# Patient Record
Sex: FEMALE | Race: BLACK OR AFRICAN AMERICAN
[De-identification: names, ages, dates, MRNs, and addresses within clinical notes are randomized per-mention and may not be internally consistent; named-entity substitution may affect disease eponyms.]

---

## 2018-03-15 ENCOUNTER — HOSPITAL ENCOUNTER (EMERGENCY)
Dept: HOSPITAL 62 - ER | Age: 78
Discharge: HOME | End: 2018-03-15
Payer: MEDICARE

## 2018-03-15 VITALS — SYSTOLIC BLOOD PRESSURE: 179 MMHG | DIASTOLIC BLOOD PRESSURE: 82 MMHG

## 2018-03-15 DIAGNOSIS — I12.0: ICD-10-CM

## 2018-03-15 DIAGNOSIS — K62.5: Primary | ICD-10-CM

## 2018-03-15 DIAGNOSIS — N18.6: ICD-10-CM

## 2018-03-15 DIAGNOSIS — Z87.19: ICD-10-CM

## 2018-03-15 DIAGNOSIS — R01.1: ICD-10-CM

## 2018-03-15 DIAGNOSIS — D64.9: ICD-10-CM

## 2018-03-15 DIAGNOSIS — Z79.899: ICD-10-CM

## 2018-03-15 DIAGNOSIS — Z99.2: ICD-10-CM

## 2018-03-15 DIAGNOSIS — E11.22: ICD-10-CM

## 2018-03-15 DIAGNOSIS — Z87.11: ICD-10-CM

## 2018-03-15 DIAGNOSIS — K64.9: ICD-10-CM

## 2018-03-15 LAB
ADD MANUAL DIFF: NO
ALBUMIN SERPL-MCNC: 3.6 G/DL (ref 3.5–5)
ALP SERPL-CCNC: 118 U/L (ref 38–126)
ALT SERPL-CCNC: 28 U/L (ref 9–52)
ANION GAP SERPL CALC-SCNC: 10 MMOL/L (ref 5–19)
AST SERPL-CCNC: 14 U/L (ref 14–36)
BASOPHILS # BLD AUTO: 0 10^3/UL (ref 0–0.2)
BASOPHILS NFR BLD AUTO: 0.6 % (ref 0–2)
BILIRUB DIRECT SERPL-MCNC: 0.4 MG/DL (ref 0–0.4)
BILIRUB SERPL-MCNC: 0.5 MG/DL (ref 0.2–1.3)
BUN SERPL-MCNC: 31 MG/DL (ref 7–20)
CALCIUM: 9 MG/DL (ref 8.4–10.2)
CHLORIDE SERPL-SCNC: 97 MMOL/L (ref 98–107)
CO2 SERPL-SCNC: 30 MMOL/L (ref 22–30)
EOSINOPHIL # BLD AUTO: 0.1 10^3/UL (ref 0–0.6)
EOSINOPHIL NFR BLD AUTO: 2.8 % (ref 0–6)
ERYTHROCYTE [DISTWIDTH] IN BLOOD BY AUTOMATED COUNT: 18.8 % (ref 11.5–14)
GLUCOSE SERPL-MCNC: 96 MG/DL (ref 75–110)
HCT VFR BLD CALC: 33.2 % (ref 36–47)
HGB BLD-MCNC: 10.5 G/DL (ref 12–15.5)
INR PPP: 1.07
LYMPHOCYTES # BLD AUTO: 1.2 10^3/UL (ref 0.5–4.7)
LYMPHOCYTES NFR BLD AUTO: 24.2 % (ref 13–45)
MCH RBC QN AUTO: 29.5 PG (ref 27–33.4)
MCHC RBC AUTO-ENTMCNC: 31.6 G/DL (ref 32–36)
MCV RBC AUTO: 93 FL (ref 80–97)
MONOCYTES # BLD AUTO: 0.4 10^3/UL (ref 0.1–1.4)
MONOCYTES NFR BLD AUTO: 7.8 % (ref 3–13)
NEUTROPHILS # BLD AUTO: 3.3 10^3/UL (ref 1.7–8.2)
NEUTS SEG NFR BLD AUTO: 64.6 % (ref 42–78)
PLATELET # BLD: 226 10^3/UL (ref 150–450)
POTASSIUM SERPL-SCNC: 4.3 MMOL/L (ref 3.6–5)
PROT SERPL-MCNC: 6.5 G/DL (ref 6.3–8.2)
PROTHROMBIN TIME: 14.6 SEC (ref 11.4–15.4)
RBC # BLD AUTO: 3.56 10^6/UL (ref 3.72–5.28)
SODIUM SERPL-SCNC: 136.9 MMOL/L (ref 137–145)
TOTAL CELLS COUNTED % (AUTO): 100 %
WBC # BLD AUTO: 5.1 10^3/UL (ref 4–10.5)

## 2018-03-15 PROCEDURE — 85610 PROTHROMBIN TIME: CPT

## 2018-03-15 PROCEDURE — 82272 OCCULT BLD FECES 1-3 TESTS: CPT

## 2018-03-15 PROCEDURE — 86901 BLOOD TYPING SEROLOGIC RH(D): CPT

## 2018-03-15 PROCEDURE — 99285 EMERGENCY DEPT VISIT HI MDM: CPT

## 2018-03-15 PROCEDURE — 85025 COMPLETE CBC W/AUTO DIFF WBC: CPT

## 2018-03-15 PROCEDURE — 80053 COMPREHEN METABOLIC PANEL: CPT

## 2018-03-15 PROCEDURE — 36415 COLL VENOUS BLD VENIPUNCTURE: CPT

## 2018-03-15 PROCEDURE — 86850 RBC ANTIBODY SCREEN: CPT

## 2018-03-15 PROCEDURE — 86900 BLOOD TYPING SEROLOGIC ABO: CPT

## 2018-03-15 NOTE — ER DOCUMENT REPORT
ED General





- General


Mode of Arrival: Ambulatory


Information source: Patient


TRAVEL OUTSIDE OF THE [place] IN LAST 30 DAYS: No





<ERNESTO GARCIA - Last Filed: 03/15/18 16:07>





<MARIAA BUSCH - Last Filed: 03/15/18 16:10>





- General


Chief Complaint: Bloody Stools


Stated Complaint: BLOOD IN STOOL


Time Seen by Provider: 03/15/18 08:19


Notes: 


Patient is a 77 year old female with a history of HTN, gall stones, stomach 

ulcers, and dialysis presents to the emergency department complaining of bloody 

stool onset this morning. Patient states she was having a bowel movement this 

morning when she noticed bright red blood in the stool which happened twice. 

Patient denies dizziness, nausea, vomiting or any abdominal pain. 





Patient states she was seen by Dr. Osorio on 3/8/2018 and was found to have a 

cyst on her liver (review of records show she saw Dr. Dover). Patient also 

mentions doing dialysis on MWF. Patient states she normally takes Clonidine (.2

) but was unable to do so this morning. 


 (ERNESTO GARCIA)





- Related Data


Allergies/Adverse Reactions: 


 





No Known Allergies Allergy (Unverified 11/19/16 11:44)


 











Past Medical History





- General


Information source: Patient





- Social History


Smoking Status: Never Smoker


Cigarette use (# per day): No


Chew tobacco use (# tins/day): Yes


Smoking Education Provided: No


Frequency of alcohol use: None


Family History: Reviewed & Not Pertinent





- Past Medical History


Cardiac Medical History: Reports: Hx Hypercholesterolemia, Hx Hypertension


Neurological Medical History: Reports: Hx Cerebrovascular Accident


Endocrine Medical History: Reports: Hx Diabetes Mellitus Type 2


Renal/ Medical History: Reports: Other - Renal Failure, Dialysis


Past Surgical History: Reports: Hx Tubal Ligation, Other - Endarterectomy





<ERNESTO GARCIA - Last Filed: 03/15/18 16:07>





Review of Systems





- Review of Systems


Constitutional: No symptoms reported


EENT: No symptoms reported


Cardiovascular: No symptoms reported


Respiratory: No symptoms reported


Gastrointestinal: See HPI, Rectal bleeding


Genitourinary: No symptoms reported


Female Genitourinary: No symptoms reported


Musculoskeletal: No symptoms reported


Skin: No symptoms reported


Hematologic/Lymphatic: No symptoms reported


Neurological/Psychological: No symptoms reported


-: Yes All other systems reviewed and negative





<ERNESTO GARCIA - Last Filed: 03/15/18 16:07>





Physical Exam





<ERNESTO GARCIA - Last Filed: 03/15/18 16:07>





<MARIAA BUSCH - Last Filed: 03/15/18 16:10>





- Vital signs


Vitals: 


 











Temp Pulse Resp BP Pulse Ox


 


 98.7 F   105 H  18   247/110 H  94 


 


 03/15/18 07:55  03/15/18 07:55  03/15/18 07:55  03/15/18 07:55  03/15/18 07:55














- Notes


Notes: 


GENERAL: Alert, interacts well. No acute distress.


HEAD: Normocephalic, atraumatic.


EYES: Pupils equal, round, and reactive to light. Extraocular movements intact.


ENT: Oral mucosa moist, tongue midline. 


NECK: Full range of motion. Supple. Trachea midline.


LUNGS: Clear to auscultation bilaterally, no wheezes, rales, or rhonchi. No 

respiratory distress.


HEART: 3/6 systolic murmur, best heard at RUSB. No gallops or rubs.


ABDOMEN: Soft, non-tender. Non-distended. Bowel sounds present in all 4 

quadrants.


EXTREMITIES: Moves all 4 extremities spontaneously. No edema, radial and 

dorsalis pedis pulses 2/4 bilaterally. No cyanosis.


NEUROLOGICAL: Alert and oriented x3. Normal speech. 


PSYCH: Normal affect, normal mood.


SKIN: Warm, dry, normal turgor. No rashes or lesions noted.


RECTAL: Nonthrombosed hemorrhoids at 6 o'clock, no melena, small amount of dark 

brown blood.





 (ERNESTO GARCIA)





Course





- Laboratory


Result Diagrams: 


 03/15/18 08:48





 03/15/18 10:22





- Consults


  ** Dr. Dover


Time consulted: 13:10 - Agrees that the patient can be released to home, 

recommends having the patient follow-up with him as an outpatient should she 

have any more bleeding.





<ERNESTO GARCIA - Last Filed: 03/15/18 16:07>





- Laboratory


Result Diagrams: 


 03/15/18 08:48





 03/15/18 10:22





<MARIAA BUSCH - Last Filed: 03/15/18 16:10>





- Re-evaluation


Re-evalutation: 





03/15/18 13:21


CBC shows chronic anemia with hemoglobin 10.5 not significant change from prior

, white count and platelets normal, INR slightly prolonged at 1.07, chemistries 

show chronic renal failure with a BUN of 31 and creatinine of 7.25, occult 

blood test is positive though there is no evidence of blood on the rectal exam 

itself, there is one nonthrombosed hemorrhoid at the 6 o'clock position.  No 

evidence of ongoing bleeding.  No further bleeding in the emergency department.

  I did discuss the case with Dr. Dover who is the patient's gastroenterologist

, he agrees that the patient can be released to home, recommends having the 

patient follow-up with him as an outpatient should she have any more bleeding.  

Patient is agreeable to calling his office to set up an outpatient appointment 

and to return to the emergency department for large volumes of rectal bleeding. 

(MARIAA BUSCH)





- Vital Signs


Vital signs: 


 











Temp Pulse Resp BP Pulse Ox


 


 98.7 F   65   14   179/82 H  96 


 


 03/15/18 07:55  03/15/18 13:30  03/15/18 13:30  03/15/18 13:30  03/15/18 13:30














- Laboratory


Laboratory results interpreted by me: 


 











  03/15/18 03/15/18





  08:48 10:22


 


RBC  3.56 L 


 


Hgb  10.5 L 


 


Hct  33.2 L 


 


MCHC  31.6 L 


 


RDW  18.8 H 


 


Sodium   136.9 L


 


Chloride   97 L


 


BUN   31 H


 


Creatinine   7.26 H


 


Est GFR ( Amer)   7 L


 


Est GFR (Non-Af Amer)   5 L














Discharge





<ERNESTO GARCIA - Last Filed: 03/15/18 16:07>





<MARIAA BUSCH - Last Filed: 03/15/18 16:10>





- Discharge


Clinical Impression: 


 Rectal bleeding, ESRD (end stage renal disease) on dialysis





Hypertension


Qualifiers:


 Hypertension type: renovascular hypertension Qualified Code(s): I15.0 - 

Renovascular hypertension





Condition: Stable


Disposition: HOME, SELF-CARE


Instructions:  Rectal Bleeding, Unclear Cause ([facility])


Referrals: 


YORDAN ADLER MD [Primary Care Provider] - Follow up as needed


UBALDO DOVER MD [ACTIVE STAFF] - Follow up in 1 month


Madhavi Attestation: 





03/15/18 16:10


I personally performed the services described in the documentation, reviewed 

and edited the documentation which was dictated to the scribe in my presence, 

and it accurately records my words and actions. (MARIAA BUSCH)





Scribe Documentation





- Scribe


Written by Madhavi:: Madhavi Cevallos, 3/15/2018 08:40


acting as scribe for :: Ela





<ERNESTO GARCIA - Last Filed: 03/15/18 16:07>

## 2019-04-18 ENCOUNTER — HOSPITAL ENCOUNTER (OUTPATIENT)
Dept: HOSPITAL 62 - WI | Age: 79
End: 2019-04-18
Attending: INTERNAL MEDICINE
Payer: MEDICARE

## 2019-04-18 DIAGNOSIS — Q44.6: Primary | ICD-10-CM

## 2019-04-18 DIAGNOSIS — K76.89: ICD-10-CM

## 2019-04-18 DIAGNOSIS — N18.9: ICD-10-CM

## 2019-04-18 PROCEDURE — 76700 US EXAM ABDOM COMPLETE: CPT

## 2019-04-18 NOTE — WOMENS IMAGING REPORT
EXAM DESCRIPTION:  U/S ABDOMEN TOTAL



COMPLETED DATE/TIME:  4/18/2019 11:13 am



REASON FOR STUDY:  Q44.6 CYSTIC DISEASE OF LIVER,N18.9 CHRONIC KIDNEY DISEASE, UNSPECIFIED Q44.6  CYS
TIC DISEASE OF LIVER N18.9  CHRONIC KIDNEY DISEASE, UNSPECIFIED



COMPARISON:  Ultrasound dated 2/15/2018, CT dated 1/30/2018



TECHNIQUE:  Dynamic and static grayscale images acquired of the abdomen and recorded on PACS. Additio
nal selected color Doppler and spectral images recorded.

Note:  Study does not meet criteria for complete doppler/duplex scan



LIMITATIONS:  None.



FINDINGS:  PANCREAS: Head of the pancreas appears normal.  The body and tail are obscured by overlyin
g bowel gas.

LIVER: Complex 6.3 x 4.5 x 7.1 cm lesion in the left lobe of liver.  There are internal echoes.  It h
as decreased in size when compared to prior study.  Previously was measured 11.9 x 9.0 x 7.8 cm.

LIVER VASCULATURE: Normal directional flow of the main portal vein and hepatic veins.

GALLBLADDER: There are numerous gallstones and sludge.  Gallbladder polyps cannot be excluded.

ULTRASOUND-DETECTED HAMPTON'S SIGN: Negative.

INTRAHEPATIC DUCTS AND COMMON DUCT: CBD and intrahepatic ducts normal caliber. No filling defects.

INFERIOR VENA CAVA: Normal flow.

AORTA: No aneurysm.

RIGHT KIDNEY:  Atrophic in appearance.   Multiple cysts.   No solid or suspicious masses.   No hydron
ephrosis.   No calcifications.

LEFT KIDNEY:  Atrophic in appearance.   Multiple cysts.  There is a complex mass with flow demonstrat
ed on duplex images.  This measures 5.6 x 5.1 x 5.3 cm.   No solid or suspicious masses.   No hydrone
phrosis.   No calcifications.

SPLEEN: Normal size. No solid masses.

PERITONEAL AND PLEURAL SPACES: No ascites or effusions.

OTHER: No other significant finding.



IMPRESSION:  1.  Complex cystic lesion the left lobe of liver measured 6.3 x 4.5 x 7.1 cm on today's 
study.  This is decreased in size since previous exam.

2.  Complex 5.6 x 5.1 x 5.3 cm lesion in the left kidney.  This appear to represent simple cysts on p
rior CT abdomen pelvis done in 2018.  On today's study there appears to be the least the component of
 vascularized tissue.  Further evaluation with contrasted CT is recommended.



TECHNICAL DOCUMENTATION:  JOB ID:  [id number]

 2011 [employer]- All Rights Reserved



Reading location - IP/workstation name: [email or web address]

## 2019-04-30 ENCOUNTER — HOSPITAL ENCOUNTER (OUTPATIENT)
Dept: HOSPITAL 62 - RAD | Age: 79
End: 2019-04-30
Attending: INTERNAL MEDICINE
Payer: MEDICARE

## 2019-04-30 DIAGNOSIS — N28.1: ICD-10-CM

## 2019-04-30 DIAGNOSIS — Q44.6: Primary | ICD-10-CM

## 2019-04-30 PROCEDURE — 74170 CT ABD WO CNTRST FLWD CNTRST: CPT

## 2019-04-30 PROCEDURE — 82565 ASSAY OF CREATININE: CPT

## 2019-04-30 NOTE — RADIOLOGY REPORT (SQ)
EXAM DESCRIPTION:  CT ABDOMEN COMBO



COMPLETED DATE/TIME:  4/30/2019 8:21 am



REASON FOR STUDY:  CYST OF LIVER (Q44.6), CYST OF KIDNEY, ACQUIRED (N28.1) Z53.9  PROCEDURE AND TREAT
MENT NOT CARRIED OUT, UNSPECIFIED R Q44.6  CYSTIC DISEASE OF LIVER



COMPARISON:  1/30/2018



TECHNIQUE:  CT scan of the abdomen performed with and without intravenous contrast, and without oral 
contrast. Contrasted imaging performed using helical scanning technique with dynamic intravenous cont
rast injection. Images reviewed with lung, soft tissue, and bone windows. Reconstructed coronal and s
agittal MPR images reviewed. Delayed images for evaluation of the urinary system also acquired and ev
aluated. All images stored on PACS.

All CT scanners at this facility use dose modulation, iterative reconstruction, and/or weight based d
osing when appropriate to reduce radiation dose to as low as reasonably achievable (ALARA).

[facility]: Dose Right  [facility]: CareDose    [facility]: Dose Right    [facility]: Teradose 4D    [facility]: Smart Technologies



CONTRAST TYPE AND DOSE:  contrast/concentration: Isovue 350.00 mg/ml; Total Contrast Delivered: 47.0 
ml; Total Saline Delivered: 77.0 ml



RENAL FUNCTION:  On hemodialysis.



RADIATION DOSE:  CT Rad equipment meets quality standard of care and radiation dose reduction techniq
ues were employed. CTDIvol: 5.8 - 14.1 mGy. DLP: 822 mGy-cm..



LIMITATIONS:  None.



FINDINGS:  NONCONTRASTED IMAGING: Punctate dependent calcifications left renal cyst.

POSTCONTRASTED IMAGING:

LOWER CHEST: Cardiomegaly.

LIVER: Stable cyst lateral segment left lobe.

SPLEEN: Normal size. No focal lesions.

PANCREAS: No masses. No significant calcifications. No adjacent inflammation or peripancreatic fluid 
collections. Pancreatic duct not dilated.

GALLBLADDER: Gallstones. No inflammatory changes to suggest cholecystitis.

ADRENAL GLANDS: No significant masses or asymmetry.

RIGHT KIDNEY AND URETER: Atrophic.  Multiple cysts.  No solid masses.  No hydronephrosis or hydrouret
er.

LEFT KIDNEY AND URETER: Atrophic.  6.4 cm cyst measuring 10 HU pre and postcontrast.  No hydronephros
is or hydroureter.

AORTA AND VESSELS: No aneurysm.

RETROPERITONEUM: No retroperitoneal adenopathy, hemorrhage or masses.

BOWEL AND PERITONEAL CAVITY: No masses or inflammatory changes. No free fluid or peritoneal masses.

APPENDIX: Not visualized.

ABDOMINAL WALL: No masses. No hernias.

BONES: Nothing acute.

OTHER: No other significant finding.



IMPRESSION:  Atrophic nonfunctioning kidneys.  Large cyst left kidney.  No solid mass.



TECHNICAL DOCUMENTATION:  JOB ID:  [id number]

Quality ID # 436: Final reports with documentation of one or more dose reduction techniques (e.g., Au
tomated exposure control, adjustment of the mA and/or kV according to patient size, use of iterative 
reconstruction technique)

 2011 [employer]- All Rights Reserved



Reading location - IP/workstation name: [email or web address]

## 2019-07-23 ENCOUNTER — HOSPITAL ENCOUNTER (OUTPATIENT)
Dept: HOSPITAL 62 - OD | Age: 79
End: 2019-07-23
Attending: FAMILY MEDICINE
Payer: MEDICARE

## 2019-07-23 DIAGNOSIS — R06.2: ICD-10-CM

## 2019-07-23 DIAGNOSIS — I51.7: Primary | ICD-10-CM

## 2019-07-23 PROCEDURE — 71046 X-RAY EXAM CHEST 2 VIEWS: CPT

## 2019-07-23 NOTE — RADIOLOGY REPORT (SQ)
EXAM DESCRIPTION:  CHEST PA/LATERAL



COMPLETED DATE/TIME:  7/23/2019 10:33 am



REASON FOR STUDY:  WHEEZING



COMPARISON:  11/19/2016



EXAM PARAMETERS:  NUMBER OF VIEWS: two views

TECHNIQUE: Digital Frontal and Lateral radiographic views of the chest acquired.

RADIATION DOSE: NA

LIMITATIONS: none



FINDINGS:  LUNGS AND PLEURA: There are small bilateral pleural effusions.  No consolidation.  Interst
itial markings are prominent in both bases right greater than left.  Perihilar interstitial markings 
are prominent as well.

MEDIASTINUM AND HILAR STRUCTURES: No masses or contour abnormalities.

HEART AND VASCULAR STRUCTURES: Heart is enlarged with central vascular congestion.

BONES: No acute findings.

HARDWARE: None in the chest.

OTHER: No other significant finding.



IMPRESSION:  Cardiomegaly, vascular congestion and small pleural effusions.



TECHNICAL DOCUMENTATION:  JOB ID:  [id number]

 2011 [employer]- All Rights Reserved



Reading location - IP/workstation name: [email or web address]

## 2020-09-13 ENCOUNTER — HOSPITAL ENCOUNTER (INPATIENT)
Dept: HOSPITAL 62 - ER | Age: 80
LOS: 4 days | Discharge: HOME | DRG: 306 | End: 2020-09-17
Attending: FAMILY MEDICINE | Admitting: FAMILY MEDICINE
Payer: MEDICARE

## 2020-09-13 DIAGNOSIS — Z20.828: ICD-10-CM

## 2020-09-13 DIAGNOSIS — N18.6: ICD-10-CM

## 2020-09-13 DIAGNOSIS — E78.5: ICD-10-CM

## 2020-09-13 DIAGNOSIS — I35.0: Primary | ICD-10-CM

## 2020-09-13 DIAGNOSIS — Z87.891: ICD-10-CM

## 2020-09-13 DIAGNOSIS — E11.22: ICD-10-CM

## 2020-09-13 DIAGNOSIS — R79.89: ICD-10-CM

## 2020-09-13 DIAGNOSIS — I13.2: ICD-10-CM

## 2020-09-13 DIAGNOSIS — I50.9: ICD-10-CM

## 2020-09-13 DIAGNOSIS — Z79.899: ICD-10-CM

## 2020-09-13 DIAGNOSIS — Z99.2: ICD-10-CM

## 2020-09-13 DIAGNOSIS — Z82.49: ICD-10-CM

## 2020-09-13 LAB
ABSOLUTE LYMPHOCYTES# (MANUAL): 0.6 10^3/UL (ref 0.5–4.7)
ABSOLUTE MONOCYTES # (MANUAL): 0.4 10^3/UL (ref 0.1–1.4)
ADD MANUAL DIFF: YES
ALBUMIN SERPL-MCNC: 4.4 G/DL (ref 3.5–5)
ALP SERPL-CCNC: 122 U/L (ref 38–126)
ANION GAP SERPL CALC-SCNC: 19 MMOL/L (ref 5–19)
ANISOCYTOSIS BLD QL SMEAR: (no result)
ARTERIAL BLOOD FIO2: (no result)
ARTERIAL BLOOD H2CO3: 1.37 MMOL/L (ref 1.05–1.35)
ARTERIAL BLOOD HCO3: 25.8 MMOL/L (ref 20–24)
ARTERIAL BLOOD PCO2: 45.6 MMHG (ref 35–45)
ARTERIAL BLOOD PH: 7.37 (ref 7.35–7.45)
ARTERIAL BLOOD PO2: 84.4 MMHG (ref 80–100)
ARTERIAL BLOOD TOTAL CO2: 27.2 MMOL/L (ref 21–25)
AST SERPL-CCNC: 23 U/L (ref 14–36)
BASE EXCESS BLDA CALC-SCNC: 0.4 MMOL/L
BASOPHILS NFR BLD MANUAL: 0 % (ref 0–2)
BILIRUB DIRECT SERPL-MCNC: 0.5 MG/DL (ref 0–0.4)
BILIRUB SERPL-MCNC: 0.9 MG/DL (ref 0.2–1.3)
BUN SERPL-MCNC: 32 MG/DL (ref 7–20)
CALCIUM: 9.4 MG/DL (ref 8.4–10.2)
CHLORIDE SERPL-SCNC: 100 MMOL/L (ref 98–107)
CK MB SERPL-MCNC: 2.27 NG/ML (ref ?–4.55)
CK SERPL-CCNC: 75 U/L (ref 30–135)
CO2 SERPL-SCNC: 23 MMOL/L (ref 22–30)
EOSINOPHIL NFR BLD MANUAL: 0 % (ref 0–6)
ERYTHROCYTE [DISTWIDTH] IN BLOOD BY AUTOMATED COUNT: 15.9 % (ref 11.5–14)
FERRITIN SERPL-MCNC: 1100 NG/ML (ref 11.1–264)
GLUCOSE SERPL-MCNC: 146 MG/DL (ref 75–110)
HCT VFR BLD CALC: 28.5 % (ref 36–47)
HGB BLD-MCNC: 9.5 G/DL (ref 12–15.5)
MCH RBC QN AUTO: 31.2 PG (ref 27–33.4)
MCHC RBC AUTO-ENTMCNC: 33.3 G/DL (ref 32–36)
MCV RBC AUTO: 94 FL (ref 80–97)
MONOCYTES % (MANUAL): 4 % (ref 3–13)
OVALOCYTES BLD QL SMEAR: (no result)
PLATELET # BLD: 193 10^3/UL (ref 150–450)
PLATELET COMMENT: ADEQUATE
POLYCHROMASIA BLD QL SMEAR: SLIGHT
POTASSIUM SERPL-SCNC: 3.1 MMOL/L (ref 3.6–5)
PROT SERPL-MCNC: 7.5 G/DL (ref 6.3–8.2)
RBC # BLD AUTO: 3.03 10^6/UL (ref 3.72–5.28)
SAO2 % BLDA: 96 % (ref 94–98)
SCHISTOCYTES BLD QL SMEAR: SLIGHT
SEGMENTED NEUTROPHILS % (MAN): 90 % (ref 42–78)
TOTAL CELLS COUNTED BLD: 100
TROPONIN I SERPL-MCNC: 0.19 NG/ML
VARIANT LYMPHS NFR BLD MANUAL: 6 % (ref 13–45)
WBC # BLD AUTO: 9.5 10^3/UL (ref 4–10.5)

## 2020-09-13 PROCEDURE — 99285 EMERGENCY DEPT VISIT HI MDM: CPT

## 2020-09-13 PROCEDURE — 36600 WITHDRAWAL OF ARTERIAL BLOOD: CPT

## 2020-09-13 PROCEDURE — 80053 COMPREHEN METABOLIC PANEL: CPT

## 2020-09-13 PROCEDURE — 71045 X-RAY EXAM CHEST 1 VIEW: CPT

## 2020-09-13 PROCEDURE — 80048 BASIC METABOLIC PNL TOTAL CA: CPT

## 2020-09-13 PROCEDURE — 86140 C-REACTIVE PROTEIN: CPT

## 2020-09-13 PROCEDURE — 87340 HEPATITIS B SURFACE AG IA: CPT

## 2020-09-13 PROCEDURE — 82550 ASSAY OF CK (CPK): CPT

## 2020-09-13 PROCEDURE — 96375 TX/PRO/DX INJ NEW DRUG ADDON: CPT

## 2020-09-13 PROCEDURE — 87150 DNA/RNA AMPLIFIED PROBE: CPT

## 2020-09-13 PROCEDURE — 83735 ASSAY OF MAGNESIUM: CPT

## 2020-09-13 PROCEDURE — 84145 PROCALCITONIN (PCT): CPT

## 2020-09-13 PROCEDURE — 83615 LACTATE (LD) (LDH) ENZYME: CPT

## 2020-09-13 PROCEDURE — 36415 COLL VENOUS BLD VENIPUNCTURE: CPT

## 2020-09-13 PROCEDURE — 87077 CULTURE AEROBIC IDENTIFY: CPT

## 2020-09-13 PROCEDURE — 96368 THER/DIAG CONCURRENT INF: CPT

## 2020-09-13 PROCEDURE — 94640 AIRWAY INHALATION TREATMENT: CPT

## 2020-09-13 PROCEDURE — 85025 COMPLETE CBC W/AUTO DIFF WBC: CPT

## 2020-09-13 PROCEDURE — 5A09457 ASSISTANCE WITH RESPIRATORY VENTILATION, 24-96 CONSECUTIVE HOURS, CONTINUOUS POSITIVE AIRWAY PRESSURE: ICD-10-PCS | Performed by: FAMILY MEDICINE

## 2020-09-13 PROCEDURE — 87186 SC STD MICRODIL/AGAR DIL: CPT

## 2020-09-13 PROCEDURE — 82803 BLOOD GASES ANY COMBINATION: CPT

## 2020-09-13 PROCEDURE — 82962 GLUCOSE BLOOD TEST: CPT

## 2020-09-13 PROCEDURE — 84484 ASSAY OF TROPONIN QUANT: CPT

## 2020-09-13 PROCEDURE — 96365 THER/PROPH/DIAG IV INF INIT: CPT

## 2020-09-13 PROCEDURE — 83605 ASSAY OF LACTIC ACID: CPT

## 2020-09-13 PROCEDURE — 87635 SARS-COV-2 COVID-19 AMP PRB: CPT

## 2020-09-13 PROCEDURE — 82553 CREATINE MB FRACTION: CPT

## 2020-09-13 PROCEDURE — 80074 ACUTE HEPATITIS PANEL: CPT

## 2020-09-13 PROCEDURE — 86317 IMMUNOASSAY INFECTIOUS AGENT: CPT

## 2020-09-13 PROCEDURE — 87040 BLOOD CULTURE FOR BACTERIA: CPT

## 2020-09-13 PROCEDURE — 93005 ELECTROCARDIOGRAM TRACING: CPT

## 2020-09-13 PROCEDURE — 94660 CPAP INITIATION&MGMT: CPT

## 2020-09-13 PROCEDURE — 93306 TTE W/DOPPLER COMPLETE: CPT

## 2020-09-13 PROCEDURE — 82728 ASSAY OF FERRITIN: CPT

## 2020-09-13 PROCEDURE — C9803 HOPD COVID-19 SPEC COLLECT: HCPCS

## 2020-09-13 PROCEDURE — 93010 ELECTROCARDIOGRAM REPORT: CPT

## 2020-09-13 NOTE — RADIOLOGY REPORT (SQ)
EXAM DESCRIPTION:  CHEST SINGLE VIEW



IMAGES COMPLETED DATE/TIME:  9/13/2020 6:37 pm



REASON FOR STUDY:  respiratory distress



COMPARISON:  7/23/2019



EXAM PARAMETERS:  NUMBER OF VIEWS: One view.

TECHNIQUE: Single frontal radiographic view of the chest acquired.

RADIATION DOSE: NA

LIMITATIONS: None.



FINDINGS:  LUNGS AND PLEURA: Increased perihilar lung markings.  No pleural effusion.  No pneumothora
x.

MEDIASTINUM AND HILAR STRUCTURES: No masses.  Contour normal.

HEART AND VASCULAR STRUCTURES: Cardiomegaly with central vascular congestion.

BONES: No acute findings.

HARDWARE: Left axillary and subclavian stents.

OTHER: No other significant finding.



IMPRESSION:  Findings suggest progressive CHF exacerbation.  Superimposed infectious process is not e
xcluded.



TECHNICAL DOCUMENTATION:  JOB ID:  [id number]

 2011 [employer]- All Rights Reserved



Reading location - IP/workstation name: [email or web address]

## 2020-09-13 NOTE — ER DOCUMENT REPORT
ED Respiratory Problem





- General


Stated Complaint: WEAKNESS


Time Seen by Provider: 09/13/20 18:04


Primary Care Provider: 


YORDAN ADLER MD [Primary Care Provider] - Follow up as needed


Cannot obtain history due to: Unstable vital signs


TRAVEL OUTSIDE OF THE [place] IN LAST 30 DAYS: No





- HPI


Patient complains to provider of: Short of breath


Onset: Yesterday


Short of Breath: Severe


Notes: 





Patient is a 79-year-old female who arrives via EMS for weakness.  On arrival, 

nursing staff alerted me that patient's room oxygen saturation was 58%.  Bipap 

was immediately ordered.  History was unable to be obtained from the patient due

to unstable vital signs.  Daughter came to the ER.  She states that patient has 

been feeling weak for a couple of days.  Yesterday she was complaining of 

nausea.  Today, she was called that her mother was so weak and slid to the 

floor.  She is a dialysis patient and gets dialysis Monday Wednesday Friday.  

She has not missed any doses.  She is due for dialysis tomorrow.  Her daughter 

denies any cough.  She is a former smoker.  States she has not been around 

anyone sick or diagnosed with COVID-19.  Never had congestive heart failure.  

Further history is limited as patient is on the BiPAP machine.





- Related Data


Allergies/Adverse Reactions: 


                                        





No Known Allergies Allergy (Verified 04/11/18 10:14)


   











Past Medical History





- General


Information source: Relative


Cannot obtain history due to: Unstable vital signs





- Social History


Smoking Status: Former Smoker


Family History: Reviewed & Not Pertinent





- Past Medical History


Cardiac Medical History: Reports: Hx Hypercholesterolemia, Hx Hypertension


Neurological Medical History: Reports: Hx Cerebrovascular Accident


Endocrine Medical History: Reports: Hx Diabetes Mellitus Type 2


Renal/ Medical History: Denies: Hx Peritoneal Dialysis


Past Surgical History: Reports: Hx Tubal Ligation, Other - Endarterectomy





Review of Systems





- Review of Systems


-: Yes ROS unobtainable due to patient's medical condition


Constitutional: Chills, Weakness.  denies: Fever


Cardiovascular: Dyspnea


Respiratory: Short of breath, Wheezing


Gastrointestinal: Nausea





Physical Exam





- Vital signs


Vitals: 


                                        











Resp Pulse Ox


 


 27 H  65 L


 


 09/13/20 17:54  09/13/20 17:54











Notes: 





VITAL SIGNS: Mild tachycardia, pulse ox 58% on room air.


GENERAL:  Acute distress  


HEAD:  Normal with no signs of head trauma.


EYES:  EOMI, conjunctiva normal, no discharge.  


EARS:  Hearing grossly intact.


NOSE: Normal. 


NECK:  Normal range of motion, no tenderness, supple, no lymphadenopathy, No 

adenopathy, no JVD.   


CHEST:  Bilateral wheezes and coarse lung sounds  


CARDIAC:  Regular rate and rhythm.  S1 and S2, without murmurs, gallops, or 

rubs.


VASCULAR:  No significant edema


ABDOMEN: Normal and soft with no tenderness


GENITOURINARY: Normal, No tenderness


LYMPATHTIC:  No lymphadenopathy noted.


MUSCULOSKELETAL:  Good range of motion of all major joints. Extremities without 

clubbing, cyanosis or edema.  


NEUROLOGICAL: Alert. Follows commands appropriately.


PSYCHIATRIC:  unable to assess


SKIN:  Normal appearance with no rashes or lesions.





Course





- Re-evaluation


Re-evalutation: 





09/14/20 02:21


Patient came in respiratory distress with pulse ox in the 50s on room air.  She 

was immediately placed on BiPAP and nebulizers and steroids were given.  

Patient's x-ray was concerning for congestion versus underlying pneumonia.  

Patient was given antibiotics.  Her initial lactic acid was high but this 

normalized so I am unsure if the first draw was accurate.  Patient also has a 

slightly bumped troponin but this can be attributed to her renal failure. She 

has not complained of any chest pain. She has been doing well on BiPAP.  We did 

attempt to do a trial off BiPAP, but patient desatted to 76% so she was placed 

back on it.  Patient was originally going to be transferred to [facility] as we did 

not have a nephrologist on tonight.  I was then informed that the transport team

will not be able to take her as she is on BiPAP and a person of interest for 

COVID.  Her COVID test has not resulted.  I did discuss with the doctor covering

for her PCP, Dr. Adler, and explained the situation.  He states that we would be

able to keep her here and see if nephrology would be able to dialyze her in the 

morning as they will be here on Monday.  Patient will be admitted to our 

facility.





- Vital Signs


Vital signs: 


                                        











Temp Pulse Resp BP Pulse Ox


 


 97.9 F      21 H  179/94 H  95 


 


 09/13/20 18:57     09/14/20 02:01  09/14/20 02:01  09/14/20 02:01














- Laboratory


Result Diagrams: 


                                 09/13/20 18:20





                                 09/13/20 18:20


Laboratory results interpreted by me: 


                                        











  09/13/20 09/13/20 09/13/20





  18:20 18:20 18:20


 


RBC  3.03 L  


 


Hgb  9.5 L  


 


Hct  28.5 L  


 


RDW  15.9 H  


 


Seg Neuts % (Manual)  90 H  


 


Lymphocytes % (Manual)  6 L  


 


Abs Neuts (Manual)  8.6 H  


 


Carbonic Acid   


 


ABG pCO2   


 


ABG HCO3   


 


ABG Total CO2   


 


Potassium   3.1 L 


 


BUN   32 H 


 


Creatinine   10.10 H 


 


Est GFR ( Amer)   4 L 


 


Est GFR (MDRD) Non-Af   4 L 


 


Glucose   146 H 


 


Lactic Acid    6.2 H


 


Ferritin   


 


Direct Bilirubin   0.5 H 














  09/13/20 09/13/20





  18:20 21:19


 


RBC  


 


Hgb  


 


Hct  


 


RDW  


 


Seg Neuts % (Manual)  


 


Lymphocytes % (Manual)  


 


Abs Neuts (Manual)  


 


Carbonic Acid   1.37 H


 


ABG pCO2   45.6 H


 


ABG HCO3   25.8 H


 


ABG Total CO2   27.2 H


 


Potassium  


 


BUN  


 


Creatinine  


 


Est GFR ( Amer)  


 


Est GFR (MDRD) Non-Af  


 


Glucose  


 


Lactic Acid  


 


Ferritin  1100.00 H 


 


Direct Bilirubin  














- Diagnostic Test


Radiology reviewed: Image reviewed, Reports reviewed





- EKG Interpretation by Me


EKG shows normal: Sinus rhythm


Rate: Tachycardia


When compared to previous EKG there are: No significant change


Additional EKG results interpreted by me: 





09/13/20 19:35


EKG interpreted by me.  Sinus rhythm at a rate of 115.  QTc 493.  No acute ST 

changes.  He is similar to previous.





Critical Care Note





- Critical Care Note


Total time excluding time spent on procedures (mins): 30


Comments: 





Upon my evaluation, this patient had a high probability of eminent or life-

threatening deterioration due to respiratory distress which required my direct 

attention, intervention and personal management.  I have personally provided 30 

minutes of critical care time.  Time includes review of laboratory data, 

radiology results, discussion with consultants and monitoring for potential 

decompensation.





Discharge





- Discharge


Clinical Impression: 


 Respiratory distress





Pneumonia


Qualifiers:


 Pneumonia type: due to unspecified organism Laterality: right Lung location: 

unspecified part of lung Qualified Code(s): J18.9 - Pneumonia, unspecified 

organism





CHF (congestive heart failure)


Qualifiers:


 Heart failure type: unspecified Heart failure chronicity: acute Qualified 

Code(s): I50.9 - Heart failure, unspecified





Condition: Serious


Disposition: ADMITTED AS INPATIENT


[facility]


[facility] Ad[facility]


Referrals: 


YORDAN ALDER MD [Primary Care Provider] - Follow up as needed

## 2020-09-14 LAB
CK MB SERPL-MCNC: 2.34 NG/ML (ref ?–4.55)
CK MB SERPL-MCNC: 2.77 NG/ML (ref ?–4.55)
TROPONIN I SERPL-MCNC: 0.93 NG/ML
TROPONIN I SERPL-MCNC: 1.08 NG/ML

## 2020-09-14 PROCEDURE — 5A1D70Z PERFORMANCE OF URINARY FILTRATION, INTERMITTENT, LESS THAN 6 HOURS PER DAY: ICD-10-PCS | Performed by: INTERNAL MEDICINE

## 2020-09-14 RX ADMIN — SEVELAMER HYDROCHLORIDE SCH MG: 800 TABLET, FILM COATED PARENTERAL at 18:16

## 2020-09-14 RX ADMIN — NIFEDIPINE SCH MG: 30 TABLET, FILM COATED, EXTENDED RELEASE ORAL at 18:16

## 2020-09-14 RX ADMIN — CLONIDINE HYDROCHLORIDE SCH MG: 0.2 TABLET ORAL at 18:15

## 2020-09-14 RX ADMIN — DEXAMETHASONE SODIUM PHOSPHATE SCH MG: 4 INJECTION, SOLUTION INTRAMUSCULAR; INTRAVENOUS at 22:25

## 2020-09-14 RX ADMIN — LIDOCAINE PRN APPLIC: 40 CREAM TOPICAL at 13:58

## 2020-09-14 RX ADMIN — INSULIN LISPRO SCH: 100 INJECTION, SOLUTION INTRAVENOUS; SUBCUTANEOUS at 22:27

## 2020-09-14 RX ADMIN — ATORVASTATIN CALCIUM SCH MG: 40 TABLET, FILM COATED ORAL at 22:23

## 2020-09-14 RX ADMIN — HEPARIN SODIUM SCH UNIT: 5000 INJECTION, SOLUTION INTRAVENOUS; SUBCUTANEOUS at 13:58

## 2020-09-14 RX ADMIN — CEFEPIME HYDROCHLORIDE SCH MLS/HR: 1 INJECTION, SOLUTION INTRAVENOUS at 22:24

## 2020-09-14 RX ADMIN — INSULIN LISPRO SCH: 100 INJECTION, SOLUTION INTRAVENOUS; SUBCUTANEOUS at 15:28

## 2020-09-14 RX ADMIN — HEPARIN SODIUM SCH UNIT: 5000 INJECTION, SOLUTION INTRAVENOUS; SUBCUTANEOUS at 22:24

## 2020-09-14 NOTE — EKG REPORT
SEVERITY:- ABNORMAL ECG -

SINUS TACHYCARDIA

PAC

LVH WITH SECONDARY REPOLARIZATION ABNORMALITY

BORDERLINE PROLONGED QT INTERVAL

:

Confirmed by: Ryan Aguero MD 14-Sep-2020 02:18:46

## 2020-09-14 NOTE — PDOC CONSULTATION
Consultation


Consult Date: 09/14/20


Attending physician:: YORDAN ADLER


Provider Consulted: SERGEI HALL


Consult reason:: Congestive heart failure





History of Present Illness


Admission Date/PCP: 


  09/14/20 02:17





  YORDAN ADLER MD





Patient complains of: Dyspnea


History of Present Illness: 


ROHIT KNIGHT is a 79 year old female


With the following active problems


1.  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


2.  Systemic hypertension


3.  Dyslipidemia





Patient came to the emergency room with worsening dyspnea and hypoxia.  

Apparently no contact with viral infection or COVID positivity.


Abnormal chest x-ray consistent with congestive heart failure although s

uperimposed infectious process cannot be excluded.  At the time of my evaluation

patient was on noninvasive means of positive pressure respiratory support.  She 

denies any chest pain.  No prior cardiac illnesses reported.  History is limited

and patient is unable to converse in detail due to respiratory distress





Review of systems cannot be obtained as patient is on BiPAP





Family history cannot be obtained as the patient is on BiPAP.








Surgical history is reported to include tubal ligation and endarterectomy 

although further details are not very clear.





Past Medical History


Cardiac Medical History: Reports: Hyperlipidema, Hypertension


Endocrine Medical History: Reports: Diabetes Mellitus Type 2


Renal/ Medical History: Reports: Chronic Kidney Disease, End Stage Renal 

Disease


Psychiatric Medical History: 


   Denies: Depression





Past Surgical History


Past Surgical History: Reports: Tubal Ligation, Other - Endarterectomy





Social History


Smoking Status: Former Smoker


Electronic Cigarette use?: No


Frequency of Alcohol Use: None


Hx Recreational Drug Use: No


Hx Prescription Drug Abuse: No





Family History


Family History: Reviewed & Not Pertinent


Parental Family History Reviewed: No - Unable to obtain due to patient on BiPAP


Children Family History Reviewed: NA


Sibling(s) Family History Reviewed.: NA





Medication/Allergy


Home Medications: 








Clonidine HCl 0.2 mg PO TID 11/19/16 


Atorvastatin Calcium [Lipitor 40 mg Tablet] 40 mg PO QHS 09/14/20 


Diclofenac Sodium 4 gm TP QIDP PRN 09/14/20 


Nifedipine [Nifedipine ER] 60 mg PO TID 09/14/20 


Sevelamer Carbonate [Renvela] 1,600 mg PO TID 09/14/20 








Allergies/Adverse Reactions: 


                                        





No Known Allergies Allergy (Verified 04/11/18 10:14)


   











Review of Systems


ROS unobtainable: Other - Unable to obtain as patient is on noninvasive 

respiratory support via BiPAP





Physical Exam


Vital Signs: 


                                        











Temp Pulse Resp BP Pulse Ox


 


 98.4 F   94   19   179/85 H  99 


 


 09/14/20 11:58  09/14/20 11:58  09/14/20 13:14  09/14/20 13:14  09/14/20 13:14








                                 Intake & Output











 09/13/20 09/14/20 09/15/20





 06:59 06:59 06:59


 


Intake Total  250 


 


Output Total  0 0


 


Balance  250 0


 


Weight  63.4 kg 63.4 kg











General appearance: PRESENT: mild distress, well-developed, well-nourished


Head exam: PRESENT: atraumatic, normocephalic


Eye exam: PRESENT: conjunctiva pink


Respiratory exam: PRESENT: crackles, decreased breath sounds, prolonged 

expiratory phas, symmetrical


Cardiovascular exam: PRESENT: RRR, +S1, +S2


Pulses: PRESENT: normal radial pulses


GI/Abdominal exam: PRESENT: soft


Rectal exam: PRESENT: deferred


Extremities exam: PRESENT: other - Left upper extremity with positive thrill on 

AV fistula.


Neurological exam: PRESENT: alert, awake, oriented to person


Psychiatric exam: PRESENT: appropriate affect


Skin exam: PRESENT: dry, intact





Results


Laboratory Results: 


                                        





                                 09/13/20 18:20 





                                 09/13/20 18:20 





                                        











  09/13/20 09/13/20 09/13/20





  18:20 18:20 18:20


 


WBC  9.5  


 


RBC  3.03 L  


 


Hgb  9.5 L  


 


Hct  28.5 L  


 


MCV  94  


 


MCH  31.2  


 


MCHC  33.3  


 


RDW  15.9 H  


 


Plt Count  193  


 


Seg Neutrophils %  Not Reportable  


 


Carbonic Acid   


 


HCO3/H2CO3 Ratio   


 


ABG pH   


 


ABG pCO2   


 


ABG pO2   


 


ABG HCO3   


 


ABG O2 Saturation   


 


ABG Base Excess   


 


FiO2   


 


Sodium   141.7 


 


Potassium   3.1 L 


 


Chloride   100 


 


Carbon Dioxide   23 


 


Anion Gap   19 


 


BUN   32 H 


 


Creatinine   10.10 H 


 


Est GFR (African Amer)   4 L 


 


Glucose   146 H 


 


Lactic Acid    6.2 H


 


Calcium   9.4 


 


Magnesium   


 


Ferritin   


 


Total Bilirubin   0.9 


 


AST   23 


 


Alkaline Phosphatase   122 


 


Total Protein   7.5 


 


Albumin   4.4 














  09/13/20 09/13/20 09/14/20





  18:20 21:19 00:43


 


WBC   


 


RBC   


 


Hgb   


 


Hct   


 


MCV   


 


MCH   


 


MCHC   


 


RDW   


 


Plt Count   


 


Seg Neutrophils %   


 


Carbonic Acid   1.37 H 


 


HCO3/H2CO3 Ratio   18:1 


 


ABG pH   7.37 


 


ABG pCO2   45.6 H 


 


ABG pO2   84.4 


 


ABG HCO3   25.8 H 


 


ABG O2 Saturation   96.0 


 


ABG Base Excess   0.4 


 


FiO2   50% 


 


Sodium   


 


Potassium   


 


Chloride   


 


Carbon Dioxide   


 


Anion Gap   


 


BUN   


 


Creatinine   


 


Est GFR (African Amer)   


 


Glucose   


 


Lactic Acid    1.3


 


Calcium   


 


Magnesium   


 


Ferritin  1100.00 H  


 


Total Bilirubin   


 


AST   


 


Alkaline Phosphatase   


 


Total Protein   


 


Albumin   














  09/14/20





  00:43


 


WBC 


 


RBC 


 


Hgb 


 


Hct 


 


MCV 


 


MCH 


 


MCHC 


 


RDW 


 


Plt Count 


 


Seg Neutrophils % 


 


Carbonic Acid 


 


HCO3/H2CO3 Ratio 


 


ABG pH 


 


ABG pCO2 


 


ABG pO2 


 


ABG HCO3 


 


ABG O2 Saturation 


 


ABG Base Excess 


 


FiO2 


 


Sodium 


 


Potassium 


 


Chloride 


 


Carbon Dioxide 


 


Anion Gap 


 


BUN 


 


Creatinine 


 


Est GFR (African Amer) 


 


Glucose 


 


Lactic Acid 


 


Calcium 


 


Magnesium  2.3


 


Ferritin 


 


Total Bilirubin 


 


AST 


 


Alkaline Phosphatase 


 


Total Protein 


 


Albumin 








                                        











  09/13/20 09/13/20 09/14/20





  18:20 18:20 00:43


 


Creatine Kinase  75  


 


CK-MB (CK-2)   2.27 


 


Troponin I   0.187  0.846














  09/14/20 09/14/20 09/14/20





  00:43 00:43 06:33


 


Creatine Kinase  76   71


 


CK-MB (CK-2)   3.59 


 


Troponin I   Cancelled 














  09/14/20 09/14/20 09/14/20





  06:33 12:49 12:49


 


Creatine Kinase   66 


 


CK-MB (CK-2)  2.77   2.34


 


Troponin I  1.080   0.932











EKG Comments: 





Twelve-lead EKG.  Nipple reviewed by me.  9/13/2020


Sinus tachycardia 150 bpm, PAC, left ventricular hypertrophy with repolarization

 abnormality, QTC is 493 ms





Chest x-ray 9/13/2020


CHF exacerbation superimposed infectious process cannot be excluded





Twelve-lead EKG 9/14/2020 1045.  To be reviewed by me.


Sinus tachycardia, multiple PACs, left ventricular hypertrophy with 

repolarization abnormality, QTC is 486 ms





Cardiac troponin


9/13/2020 1820 0.187


9/14/2020 91135.846


9/14/2020 633 1.08


9/14/2020 [phone]





Impressions: 


                                        





Chest X-Ray  09/13/20 18:05


IMPRESSION:  Findings suggest progressive CHF exacerbation.  Superimposed 

infectious process is not excluded.


 














Assessment & Plan





- Diagnosis


(1) CHF (congestive heart failure)


Qualifiers: 


   Heart failure type: unspecified   Heart failure chronicity: acute   Qualified

 Code(s): I50.9 - Heart failure, unspecified   


Is this a current diagnosis for this admission?: Yes   


Plan: 


There are symptoms suggestive of congestive heart failure including worsening 

dyspnea and abnormal chest x-ray


Patient's presentation is also suspicious for COVID related pneumonia. 


Will obtain transthoracic echocardiogram


She may require additional fluid removal at dialysis











(2) Elevated troponin


Is this a current diagnosis for this admission?: Yes   


Plan: 


This may be related to ongoing hypoxia and respiratory distress and volume 

overload.


Infectious etiology together with the presence of viral pneumonia has to be 

excluded and this can also result in troponin elevation on account of 

myocarditis.


Supportive care is necessary











- Notes


Notes: 





We will review echocardiogram


Continue hydralazine as needed for blood pressure

## 2020-09-14 NOTE — EKG REPORT
SEVERITY:- ABNORMAL ECG -

SINUS TACHYCARDIA

MULTIPLE ATRIAL PREMATURE COMPLEXES

PROBABLE LEFT ATRIAL ABNORMALITY

LEFT VENTRICULAR HYPERTROPHY

BORDERLINE PROLONGED QT INTERVAL

:

Confirmed by: Jeny Elizabeth 14-Sep-2020 17:24:51

## 2020-09-14 NOTE — PDOC CONSULTATION
Consultation


Consult Date: 09/14/20


Provider Consulted: SIMONA STALLWORTH


Consult reason:: ESRD, Acute CHF





History of Present Illness


Admission Date/PCP: 


  09/14/20 02:17





  YORDAN ADLER MD





History of Present Illness: 


ROHIT KNIGHT is a 79 year old  -American lady known to me with history 

of ESRD on maintenance hemodialysis on MWF, diabetes mellitus type 2, 

hypertension who was brought to the emergency room yesterday via EMS because of 

acute onset of worsening shortness of breath.  During her last dialysis last 

Friday she was reportedly asymptomatic and did fine.  Patient confirmed that her

symptoms just started suddenly yesterday.  In the emergency room the patient had

an oxygen saturation of 58%.  Her chest x-ray showed findings consistent with 

acute CHF versus superimposed infection.  She was placed on BiPAP and was 

admitted here in the ICU.  She was tested for COVID 19 which came out to be 

positive.  Patient also has elevated troponin for which cardiologist, Dr. Aguero

she was consulted.





I am seeing the patient during dialysis this afternoon.  She says she is feeling

better with the BiPAP on.  Her blood pressure is somewhat elevated.  She is 

otherwise tolerating dialysis.  She denies any chest pains.  She denies any 

nausea, vomiting or diarrhea.








Past Medical History


Cardiac Medical History: Reports: Hyperlipidemia, Hypertension-primary


Endocrine Medical History: Reports: Diabetes Mellitus Type 2


Renal/ Medical History: Reports: End Stage Renal Disease


Hematology Medical History: Reports Anemia of Chronic Kidney Disease





Past Surgical History


Past Surgical History: Reports: Dialysis Access Surgery AVF, Tubal Ligation, 

Other - Endarterectomy





Social History


Information Source: Patient, [facility] Records


Smoking Status: Former Smoker


Electronic Cigarette use?: No


Frequency of Alcohol Use: None


Hx Recreational Drug Use: No


Hx Prescription Drug Abuse: No





Family History


Parental Family History Reviewed: Yes


Children Family History Reviewed: Yes


Sibling(s) Family History Reviewed.: Yes





Medication/Allergy


Home Medications: 








Clonidine HCl 0.2 mg PO TID 11/19/16 


Atorvastatin Calcium [Lipitor 40 mg Tablet] 40 mg PO QHS 09/14/20 


Diclofenac Sodium 4 gm TP QIDP PRN 09/14/20 


Nifedipine [Nifedipine ER] 60 mg PO TID 09/14/20 


Sevelamer Carbonate [Renvela] 1,600 mg PO TID 09/14/20 








Allergies/Adverse Reactions: 


                                        





No Known Allergies Allergy (Verified 04/11/18 10:14)


   











Review of Systems


All systems: reviewed and no additional remarkable complaints except as stated


Review of Systems: 





Constitutional: ABSENT: chills, fatigue, fever(s), headache(s), weight gain, 

weight loss


Eyes: ABSENT: visual disturbances


Ears: ABSENT: hearing changes


Cardiovascular: ABSENT: chest pain, edema, orthropnea, palpitations


Respiratory: ABSENT: hemoptysis; admits shortness of breath and cough


Gastrointestinal: ABSENT: abdominal pain, constipation, diarrhea, hematemesis, 

hematochezia, nausea, vomiting


Genitourinary: ABSENT: dysuria, hematuria


Musculoskeletal: ABSENT: joint swelling


Integumentary: ABSENT: rash, wounds


Neurological: ABSENT: abnormal gait, abnormal speech, confusion, dizziness, 

focal weakness, numbness, syncope


Psychiatric: ABSENT: anxiety, depression


Endocrine: ABSENT: cold intolerance, heat intolerance, polydipsia, polyuria


Hematologic/Lymphatic: ABSENT: easy bleeding, easy bruising, lymphadenopathy





Physical Exam


Vital Signs: 


                                        











Temp Pulse Resp BP Pulse Ox


 


 98.4 F   94   19   179/85 H  99 


 


 09/14/20 11:58  09/14/20 11:58  09/14/20 13:14  09/14/20 13:14  09/14/20 13:14








                                 Intake & Output











 09/13/20 09/14/20 09/15/20





 06:59 06:59 06:59


 


Intake Total  250 


 


Output Total  0 0


 


Balance  250 0


 


Weight  63.4 kg 63.4 kg








Vitals during dialysis: Blood pressure 184/94, pulse rate of 108, respiration 17

 and oxygen saturation 99%, blood flow rate of 450 mL/min and dialysate flow 

rate of 800 mL/min using her left arm AV fistula.


Exam: 





General appearance: Tolerating and comfortable with BiPAP, cooperative, fairly 

developed and fairly nourished


Head exam: PRESENT: atraumatic, normocephalic


Eye exam: PRESENT: Conjunctiva Pink, EOMI, PERRLA.  ABSENT: conjunctival 

injection, scleral icterus


Mouth exam: PRESENT: moist, neck supple, tongue midline


Neck exam: PRESENT: full ROM.  ABSENT: carotid bruit, JVD, lymphadenopathy, 

thyromegaly


Respiratory exam: PRESENT: Diminished to auscultation bilaterally.  ABSENT: 

rales, rhonchi, stridor, wheezes


Cardiovascular exam: PRESENT: RRR, +S1, +S2.  ABSENT: systolic murmur


Pulses: PRESENT: normal radial pulses, normal dorsalis pedis pulses


GI/Abdominal exam: PRESENT: normal bowel sounds, soft.  ABSENT: guarding, mass, 

tenderness


Rectal exam: Deferred


Extremities exam: PRESENT: full ROM.  ABSENT: calf tenderness, pedal edema


Musculoskeletal: PRESENT: full ROM.  ABSENT: deformity


Neurological exam: PRESENT: alert, Awake, Oriented to person, Oriented to place,

 Oriented to time, reflexes normal, CN II-XII grossly intact.  ABSENT: motor 

sensory deficit


Psychiatric exam: PRESENT: appropriate affect, normal mood.  ABSENT: homicidal 

ideation, suicidal ideation


Skin exam: PRESENT: intact, dry, warm.  ABSENT: rash





Results


Laboratory Results: 


                                        





                                 09/13/20 18:20 09/13/20 18:20 





                                        











  09/13/20 09/13/20 09/13/20





  18:20 18:20 18:20


 


WBC  9.5  


 


RBC  3.03 L  


 


Hgb  9.5 L  


 


Hct  28.5 L  


 


MCV  94  


 


MCH  31.2  


 


MCHC  33.3  


 


RDW  15.9 H  


 


Plt Count  193  


 


Seg Neutrophils %  Not Reportable  


 


Carbonic Acid   


 


HCO3/H2CO3 Ratio   


 


ABG pH   


 


ABG pCO2   


 


ABG pO2   


 


ABG HCO3   


 


ABG O2 Saturation   


 


ABG Base Excess   


 


FiO2   


 


Sodium   141.7 


 


Potassium   3.1 L 


 


Chloride   100 


 


Carbon Dioxide   23 


 


Anion Gap   19 


 


BUN   32 H 


 


Creatinine   10.10 H 


 


Est GFR (African Amer)   4 L 


 


Glucose   146 H 


 


Lactic Acid    6.2 H


 


Calcium   9.4 


 


Magnesium   


 


Ferritin   


 


Total Bilirubin   0.9 


 


AST   23 


 


Alkaline Phosphatase   122 


 


Total Protein   7.5 


 


Albumin   4.4 














  09/13/20 09/13/20 09/14/20





  18:20 21:19 00:43


 


WBC   


 


RBC   


 


Hgb   


 


Hct   


 


MCV   


 


MCH   


 


MCHC   


 


RDW   


 


Plt Count   


 


Seg Neutrophils %   


 


Carbonic Acid   1.37 H 


 


HCO3/H2CO3 Ratio   18:1 


 


ABG pH   7.37 


 


ABG pCO2   45.6 H 


 


ABG pO2   84.4 


 


ABG HCO3   25.8 H 


 


ABG O2 Saturation   96.0 


 


ABG Base Excess   0.4 


 


FiO2   50% 


 


Sodium   


 


Potassium   


 


Chloride   


 


Carbon Dioxide   


 


Anion Gap   


 


BUN   


 


Creatinine   


 


Est GFR (African Amer)   


 


Glucose   


 


Lactic Acid    1.3


 


Calcium   


 


Magnesium   


 


Ferritin  1100.00 H  


 


Total Bilirubin   


 


AST   


 


Alkaline Phosphatase   


 


Total Protein   


 


Albumin   














  09/14/20





  00:43


 


WBC 


 


RBC 


 


Hgb 


 


Hct 


 


MCV 


 


MCH 


 


MCHC 


 


RDW 


 


Plt Count 


 


Seg Neutrophils % 


 


Carbonic Acid 


 


HCO3/H2CO3 Ratio 


 


ABG pH 


 


ABG pCO2 


 


ABG pO2 


 


ABG HCO3 


 


ABG O2 Saturation 


 


ABG Base Excess 


 


FiO2 


 


Sodium 


 


Potassium 


 


Chloride 


 


Carbon Dioxide 


 


Anion Gap 


 


BUN 


 


Creatinine 


 


Est GFR (African Amer) 


 


Glucose 


 


Lactic Acid 


 


Calcium 


 


Magnesium  2.3


 


Ferritin 


 


Total Bilirubin 


 


AST 


 


Alkaline Phosphatase 


 


Total Protein 


 


Albumin 








                                        











  09/13/20 09/13/20 09/14/20





  18:20 18:20 00:43


 


Creatine Kinase  75  


 


CK-MB (CK-2)   2.27 


 


Troponin I   0.187  0.846














  09/14/20 09/14/20 09/14/20





  00:43 00:43 06:33


 


Creatine Kinase  76   71


 


CK-MB (CK-2)   3.59 


 


Troponin I   Cancelled 














  09/14/20 09/14/20 09/14/20





  06:33 12:49 12:49


 


Creatine Kinase   66 


 


CK-MB (CK-2)  2.77   2.34


 


Troponin I  1.080   0.932











Impressions: 


                                        





Chest X-Ray  09/13/20 18:05


IMPRESSION:  Findings suggest progressive CHF exacerbation.  Superimposed 

infectious process is not excluded.


 














Assessment & Plan





- Diagnosis


(1) Acute respiratory failure with hypoxia


Is this a current diagnosis for this admission?: Yes   


Plan: 


Currently requiring BiPAP.  Etiology likely combination of possible COVID-19 

pneumonia, cannot exclude bacterial community-acquired pneumonia and acute CHF 

with acute pulmonary edema.








(2) COVID-19 virus detected


Is this a current diagnosis for this admission?: Yes   


Plan: 


Currently started on dexamethasone 3 mg IV every 8 hours.  On heparin 

prophylaxis.








(3) End stage renal disease


Is this a current diagnosis for this admission?: Yes   


Plan: 


We will do dialysis today for 3 hours, using the patient's left upper arm AV 

fistula, with 4 potassium bath, blood flow rate of 450 mL per minute, dialysate 

flow rate of 800 mL per minute, ultrafiltration 2 to 3 L as tolerated, no 

heparin and Procrit with 10,000 units during dialysis intravenously.  Patient is

 currently being closely monitored during dialysis treatment.  Ultrafiltration 

to be adjusted accordingly.








(4) CHF (congestive heart failure)


Qualifiers: 


   Heart failure type: unspecified   Heart failure chronicity: acute   Qualified

 Code(s): I50.9 - Heart failure, unspecified   


Is this a current diagnosis for this admission?: Yes   





(5) Hypokalemia


Is this a current diagnosis for this admission?: Yes   


Plan: 


Patient on high potassium bath during dialysis.








(6) Pneumonia


Qualifiers: 


   Pneumonia type: due to unspecified organism   Laterality: right   Lung 

location: unspecified part of lung   Qualified Code(s): J18.9 - Pneumonia, 

unspecified organism   


Is this a current diagnosis for this admission?: Yes   


Plan: 


Cannot rule out superimposed community acquired bacterial pneumonia over 

possible COVID pneumonia.  Patient started on IV cefepime, azithromycin and 

vancomycin.








(7) Anemia in chronic kidney disease, on chronic dialysis


Is this a current diagnosis for this admission?: Yes   


Plan: 


Patient to be given retrofit during dialysis today.  Due to concomitant COVID-19

 infection, patient needs to be monitored for any hypercoagulability.








(8) Elevated troponin


Is this a current diagnosis for this admission?: Yes   


Plan: 


Cardiologist, Dr. Aguero evaluated the patient.  Possible multifactorial causes 

including infectious process.








(9) Essential hypertension


Is this a current diagnosis for this admission?: Yes   


Plan: 


Currently suboptimally controlled.  Continue all oral blood pressure 

medications.  Ultrafiltration should help with blood pressure control.








(10) Type 2 diabetes mellitus


Qualifiers: 


   Diabetes mellitus long term insulin use: without long term use   Chronic 

kidney disease stage: on chronic dialysis 


Is this a current diagnosis for this admission?: Yes   





- Notes


Notes: 





Thank you very much for this consultation.  I will follow the patient with you.

## 2020-09-14 NOTE — PDOC H&P
History of Present Illness


Admission Date/PCP: 


  09/14/20 02:17





  YORDAN ADLER MD





Patient complains of: Shortness of the breath and hypoxia


History of Present Illness: 


ROHIT KNIGHT is a 79 year old female


This is a 79-year-old female's with the end-stage renal disease on hemodialysis 

history of the hypertensions hyperlipidemia basically came to the emergency dep

artments because of the shortness of the breath according to the daughter since 

last 24 hours and the patient was very hypoxic in the ER with O2 sat is 58%'s


Patients have a no contact with any COVID and no cough no fever no other 

symptoms patient was put on the BiPAP patient's chest x-ray consistent with a 

CHF questionable superimposed infections and patient was admitting in the IMCU 

for further evaluations


When I saw the patient in the ICU patient was comfortable on the BiPAP


Patient is never had any heart conditions except some mild diastolic 

dysfunctions at this point patient's troponin was elevated most likely due to 

the CHF decided to consult the cardiology and order the echocardiogram


Patient's primary need of dialysis discussed with the nephrology scheduled for 

dialysis


We will get the another EKG and discussed with the ICU nurse we will waiting for

the COVID test continues the IV antibiotic


Up to the dialysis male will get the CT of the chest to further evaluate








Past Medical History


Cardiac Medical History: Reports: Hyperlipidema, Hypertension


Endocrine Medical History: Reports: Diabetes Mellitus Type 2


Renal/ Medical History: Reports: Chronic Kidney Disease, End Stage Renal Dise

ase


Psychiatric Medical History: 


   Denies: Depression





Past Surgical History


Past Surgical History: Reports: Tubal Ligation, Other - Endarterectomy





Social History


Information Source: Patient, Relative


Smoking Status: Former Smoker


Electronic Cigarette use?: No


Frequency of Alcohol Use: None


Hx Recreational Drug Use: No


Hx Prescription Drug Abuse: No





Family History


Family History: Reviewed & Not Pertinent


Parental Family History Reviewed: Yes


Children Family History Reviewed: Yes


Sibling(s) Family History Reviewed.: Yes





Medication/Allergy


Home Medications: 








Allopurinol 1 tab PO DAILY 11/19/16 


Amlodipine Besylate 1 tab PO DAILY 11/19/16 


Cephalexin Monohydrate [Cephalexin] 1 tab PO DAILY 11/19/16 


Clonidine HCl 1 tab PO DAILY 11/19/16 


Clopidogrel Bisulfate [Clopidogrel] 1 tab PO DAILY 11/19/16 


Hydralazine HCl 1.5 tab PO DAILY 11/19/16 


Metoprolol Tartrate 1 tab PO DAILY 11/19/16 








Allergies/Adverse Reactions: 


                                        





No Known Allergies Allergy (Verified 04/11/18 10:14)


   











Review of Systems


ROS unobtainable: Due to mental status


All systems: reviewed and no additional remarkable complaints except as stated





Physical Exam


Vital Signs: 


                                        











Temp Pulse Resp BP Pulse Ox


 


 98.6 F   94   17   158/81 H  99 


 


 09/14/20 07:52  09/14/20 08:40  09/14/20 11:07  09/14/20 07:52  09/14/20 11:07








                                 Intake & Output











 09/13/20 09/14/20 09/15/20





 06:59 06:59 06:59


 


Intake Total  250 


 


Output Total  0 


 


Balance  250 


 


Weight  63.4 kg 63.4 kg











Physical Exam: 





Currently on a BiPAP


General appearance: PRESENT: no acute distress, well-developed, well-nourished


Head exam: PRESENT: atraumatic, normocephalic


Eye exam: PRESENT: conjunctiva pink, EOMI, PERRLA.  ABSENT: scleral icterus


Ear exam: PRESENT: normal external ear exam


Mouth exam: PRESENT: moist, tongue midline


Neck exam: PRESENT: full ROM.  ABSENT: carotid bruit, JVD, lymphadenopathy, 

thyromegaly


Respiratory exam: PRESENT: clear to auscultation juan


Cardiovascular exam: PRESENT: RRR.  ABSENT: diastolic murmur, rubs, systolic 

murmur


Vascular exam: PRESENT: normal capillary refill


GI/Abdominal exam: PRESENT: normal bowel sounds, soft.  ABSENT: distended, 

guarding, mass, organolmegaly, rebound, tenderness


Rectal exam: PRESENT: deferred


Neurological exam: PRESENT: alert, awake.  ABSENT: motor sensory deficit


Psychiatric exam: PRESENT: appropriate affect, normal mood.  ABSENT: homicidal 

ideation, suicidal ideation


Skin exam: PRESENT: dry, intact, warm.  ABSENT: cyanosis, rash





Results


Laboratory Results: 


                                        





                                 09/13/20 18:20 





                                 09/13/20 18:20 





                                        











  09/13/20 09/13/20 09/13/20





  18:20 18:20 18:20


 


WBC  9.5  


 


RBC  3.03 L  


 


Hgb  9.5 L  


 


Hct  28.5 L  


 


MCV  94  


 


MCH  31.2  


 


MCHC  33.3  


 


RDW  15.9 H  


 


Plt Count  193  


 


Seg Neutrophils %  Not Reportable  


 


Carbonic Acid   


 


HCO3/H2CO3 Ratio   


 


ABG pH   


 


ABG pCO2   


 


ABG pO2   


 


ABG HCO3   


 


ABG O2 Saturation   


 


ABG Base Excess   


 


FiO2   


 


Sodium   141.7 


 


Potassium   3.1 L 


 


Chloride   100 


 


Carbon Dioxide   23 


 


Anion Gap   19 


 


BUN   32 H 


 


Creatinine   10.10 H 


 


Est GFR (African Amer)   4 L 


 


Glucose   146 H 


 


Lactic Acid    6.2 H


 


Calcium   9.4 


 


Magnesium   


 


Ferritin   


 


Total Bilirubin   0.9 


 


AST   23 


 


Alkaline Phosphatase   122 


 


Total Protein   7.5 


 


Albumin   4.4 














  09/13/20 09/13/20 09/14/20





  18:20 21:19 00:43


 


WBC   


 


RBC   


 


Hgb   


 


Hct   


 


MCV   


 


MCH   


 


MCHC   


 


RDW   


 


Plt Count   


 


Seg Neutrophils %   


 


Carbonic Acid   1.37 H 


 


HCO3/H2CO3 Ratio   18:1 


 


ABG pH   7.37 


 


ABG pCO2   45.6 H 


 


ABG pO2   84.4 


 


ABG HCO3   25.8 H 


 


ABG O2 Saturation   96.0 


 


ABG Base Excess   0.4 


 


FiO2   50% 


 


Sodium   


 


Potassium   


 


Chloride   


 


Carbon Dioxide   


 


Anion Gap   


 


BUN   


 


Creatinine   


 


Est GFR (African Amer)   


 


Glucose   


 


Lactic Acid    1.3


 


Calcium   


 


Magnesium   


 


Ferritin  1100.00 H  


 


Total Bilirubin   


 


AST   


 


Alkaline Phosphatase   


 


Total Protein   


 


Albumin   














  09/14/20





  00:43


 


WBC 


 


RBC 


 


Hgb 


 


Hct 


 


MCV 


 


MCH 


 


MCHC 


 


RDW 


 


Plt Count 


 


Seg Neutrophils % 


 


Carbonic Acid 


 


HCO3/H2CO3 Ratio 


 


ABG pH 


 


ABG pCO2 


 


ABG pO2 


 


ABG HCO3 


 


ABG O2 Saturation 


 


ABG Base Excess 


 


FiO2 


 


Sodium 


 


Potassium 


 


Chloride 


 


Carbon Dioxide 


 


Anion Gap 


 


BUN 


 


Creatinine 


 


Est GFR (African Amer) 


 


Glucose 


 


Lactic Acid 


 


Calcium 


 


Magnesium  2.3


 


Ferritin 


 


Total Bilirubin 


 


AST 


 


Alkaline Phosphatase 


 


Total Protein 


 


Albumin 








                                        











  09/13/20 09/13/20 09/14/20





  18:20 18:20 00:43


 


Creatine Kinase  75  


 


CK-MB (CK-2)   2.27 


 


Troponin I   0.187  0.846














  09/14/20 09/14/20 09/14/20





  00:43 00:43 06:33


 


Creatine Kinase  76   71


 


CK-MB (CK-2)   3.59 


 


Troponin I   Cancelled 














  09/14/20





  06:33


 


Creatine Kinase 


 


CK-MB (CK-2)  2.77


 


Troponin I  1.080











Impressions: 


                                        





Chest X-Ray  09/13/20 18:05


IMPRESSION:  Findings suggest progressive CHF exacerbation.  Superimposed 

infectious process is not excluded.


 














Assessment & Plan





- Diagnosis


(1) Respiratory distress


Is this a current diagnosis for this admission?: Yes   


Plan: 


Currently on a BiPAP multiple etiology including the underlying congestive heart

 failure versus pneumonia versus COVID


Continues the BiPAP supports will wait for the COVID test will cover with the 

dexamethasone and antibiotic for school with positive will start on antiviral 

drugs


We will also get the CT angiogram








(2) End stage renal disease


Is this a current diagnosis for this admission?: Yes   


Plan: 


Discussed with the nephrology get the dialysis today








(3) Type 2 diabetes mellitus


Qualifiers: 


   Diabetes mellitus long term insulin use: without long term use   Chronic 

kidney disease stage: on chronic dialysis 


Is this a current diagnosis for this admission?: Yes   


Plan: 


Continue sliding-scale per [facility] protocol








(4) Essential hypertension


Is this a current diagnosis for this admission?: Yes   


Plan: 


Continues to current home medications








(5) Mixed hyperlipidemia


Is this a current diagnosis for this admission?: Yes   





(6) CHF (congestive heart failure)


Qualifiers: 


   Heart failure type: unspecified   Heart failure chronicity: acute   Qualified

 Code(s): I50.9 - Heart failure, unspecified   


Is this a current diagnosis for this admission?: Yes   


Plan: 


We will get the echocardiograms consult the Dr. Aguero








(7) Pneumonia


Qualifiers: 


   Pneumonia type: due to unspecified organism   Laterality: right   Lung 

location: unspecified part of lung   Qualified Code(s): J18.9 - Pneumonia, 

unspecified organism   


Is this a current diagnosis for this admission?: Yes   


Plan: 


Will cover with the IV antibiotic until the COVID come back and also continues 

to monitor








(8) Elevated troponin


Is this a current diagnosis for this admission?: Yes   


Plan: 


Most likely related to the CHF and end-stage renal disease discussed with the 

cardiology continues to monitor continues to get EKG








- Time


Time Spent: 50 to 70 Minutes


Critical Time spent with patient: 25-34 minutes


Medications reviewed and adjusted accordingly: Yes


Anticipated Discharge Disposition: Home with Home Health


Anticipated Discharge Timeframe: When patients get better





- Inpatient Certification


Based on my medical assessment, after consideration of the patient's 

comorbidities, presenting symptoms, or acuity I expect that the services needed 

warrant INPATIENT care.: Yes


I certify that my determination is in accordance with my understanding of 

Medicare's requirements for reasonable and necessary INPATIENT services [42 CFR 

412.3e].: Yes


Medical Necessity: Significant Comorbidiites Make Outpatient Treatment Too 

Risky, Need for IV Antibiotics


Post Hospital Care: D/C Planner Documentation





- Plan Summary


Plan Summary: 





Admit the patient in IMCU currently in ICU for IMCU bed continues to monitor we 

consult the pulmonary and cardiology

## 2020-09-15 LAB
ADD MANUAL DIFF: NO
ALBUMIN SERPL-MCNC: 4 G/DL (ref 3.5–5)
ALP SERPL-CCNC: 98 U/L (ref 38–126)
ANION GAP SERPL CALC-SCNC: 12 MMOL/L (ref 5–19)
AST SERPL-CCNC: 24 U/L (ref 14–36)
BASOPHILS # BLD AUTO: 0 10^3/UL (ref 0–0.2)
BASOPHILS NFR BLD AUTO: 0.2 % (ref 0–2)
BILIRUB DIRECT SERPL-MCNC: 0.7 MG/DL (ref 0–0.4)
BILIRUB SERPL-MCNC: 0.9 MG/DL (ref 0.2–1.3)
BUN SERPL-MCNC: 31 MG/DL (ref 7–20)
CALCIUM: 9.9 MG/DL (ref 8.4–10.2)
CHLORIDE SERPL-SCNC: 99 MMOL/L (ref 98–107)
CO2 SERPL-SCNC: 29 MMOL/L (ref 22–30)
CRP SERPL-MCNC: 46 MG/L (ref ?–10)
EOSINOPHIL # BLD AUTO: 0 10^3/UL (ref 0–0.6)
EOSINOPHIL NFR BLD AUTO: 0 % (ref 0–6)
ERYTHROCYTE [DISTWIDTH] IN BLOOD BY AUTOMATED COUNT: 15.6 % (ref 11.5–14)
FERRITIN SERPL-MCNC: 1060 NG/ML (ref 11.1–264)
GLUCOSE SERPL-MCNC: 127 MG/DL (ref 75–110)
HCT VFR BLD CALC: 29.9 % (ref 36–47)
HGB BLD-MCNC: 10 G/DL (ref 12–15.5)
LYMPHOCYTES # BLD AUTO: 0.6 10^3/UL (ref 0.5–4.7)
LYMPHOCYTES NFR BLD AUTO: 5.6 % (ref 13–45)
MCH RBC QN AUTO: 30.8 PG (ref 27–33.4)
MCHC RBC AUTO-ENTMCNC: 33.3 G/DL (ref 32–36)
MCV RBC AUTO: 92 FL (ref 80–97)
MONOCYTES # BLD AUTO: 0.2 10^3/UL (ref 0.1–1.4)
MONOCYTES NFR BLD AUTO: 2.2 % (ref 3–13)
NEUTROPHILS # BLD AUTO: 9 10^3/UL (ref 1.7–8.2)
NEUTS SEG NFR BLD AUTO: 92 % (ref 42–78)
PLATELET # BLD: 173 10^3/UL (ref 150–450)
POTASSIUM SERPL-SCNC: 4.5 MMOL/L (ref 3.6–5)
PROT SERPL-MCNC: 7 G/DL (ref 6.3–8.2)
RBC # BLD AUTO: 3.24 10^6/UL (ref 3.72–5.28)
TOTAL CELLS COUNTED % (AUTO): 100 %
WBC # BLD AUTO: 9.8 10^3/UL (ref 4–10.5)

## 2020-09-15 RX ADMIN — HEPARIN SODIUM SCH UNIT: 5000 INJECTION, SOLUTION INTRAVENOUS; SUBCUTANEOUS at 05:22

## 2020-09-15 RX ADMIN — ATORVASTATIN CALCIUM SCH MG: 40 TABLET, FILM COATED ORAL at 21:27

## 2020-09-15 RX ADMIN — HEPARIN SODIUM SCH UNIT: 5000 INJECTION, SOLUTION INTRAVENOUS; SUBCUTANEOUS at 21:26

## 2020-09-15 RX ADMIN — CEFEPIME HYDROCHLORIDE SCH MLS/HR: 1 INJECTION, SOLUTION INTRAVENOUS at 21:32

## 2020-09-15 RX ADMIN — INSULIN LISPRO SCH: 100 INJECTION, SOLUTION INTRAVENOUS; SUBCUTANEOUS at 08:24

## 2020-09-15 RX ADMIN — CLONIDINE HYDROCHLORIDE SCH MG: 0.2 TABLET ORAL at 17:34

## 2020-09-15 RX ADMIN — NIFEDIPINE SCH MG: 30 TABLET, FILM COATED, EXTENDED RELEASE ORAL at 21:27

## 2020-09-15 RX ADMIN — SEVELAMER HYDROCHLORIDE SCH MG: 800 TABLET, FILM COATED PARENTERAL at 09:17

## 2020-09-15 RX ADMIN — SEVELAMER HYDROCHLORIDE SCH MG: 800 TABLET, FILM COATED PARENTERAL at 17:34

## 2020-09-15 RX ADMIN — CLONIDINE HYDROCHLORIDE SCH MG: 0.2 TABLET ORAL at 14:44

## 2020-09-15 RX ADMIN — NIFEDIPINE SCH MG: 30 TABLET, FILM COATED, EXTENDED RELEASE ORAL at 14:45

## 2020-09-15 RX ADMIN — NIFEDIPINE SCH MG: 30 TABLET, FILM COATED, EXTENDED RELEASE ORAL at 05:23

## 2020-09-15 RX ADMIN — HEPARIN SODIUM SCH UNIT: 5000 INJECTION, SOLUTION INTRAVENOUS; SUBCUTANEOUS at 14:45

## 2020-09-15 RX ADMIN — INSULIN LISPRO SCH: 100 INJECTION, SOLUTION INTRAVENOUS; SUBCUTANEOUS at 16:28

## 2020-09-15 RX ADMIN — SEVELAMER HYDROCHLORIDE SCH MG: 800 TABLET, FILM COATED PARENTERAL at 14:44

## 2020-09-15 RX ADMIN — INSULIN LISPRO SCH: 100 INJECTION, SOLUTION INTRAVENOUS; SUBCUTANEOUS at 14:43

## 2020-09-15 RX ADMIN — INSULIN LISPRO SCH: 100 INJECTION, SOLUTION INTRAVENOUS; SUBCUTANEOUS at 23:50

## 2020-09-15 RX ADMIN — DEXAMETHASONE SODIUM PHOSPHATE SCH MG: 4 INJECTION, SOLUTION INTRAMUSCULAR; INTRAVENOUS at 09:17

## 2020-09-15 RX ADMIN — CLONIDINE HYDROCHLORIDE SCH MG: 0.2 TABLET ORAL at 09:17

## 2020-09-15 NOTE — PDOC TRANSFER SUMMARY
General


Admission Date/PCP: 


  09/14/20 02:17





  YORDAN ADLER MD








- Transfer Diagnosis


(1) CHF (congestive heart failure)


Is this a current diagnosis for this admission?: Yes   


Diagnosis Summary: 


Ejection fraction is preserved on echocardiogram


Patient I suspect is having episodes of flash pulmonary edema due to aortic 

stenosis and extra volume overload.











(2) Elevated troponin


Is this a current diagnosis for this admission?: Yes   


Diagnosis Summary: 


Pretest likelihood of coronary artery disease is high


Elevated troponin happened in the setting of pulmonary edema and hypoxia


Given the presence of aortic stenosis it would be most reasonable to proceed 

with cardiac authorization to delineate her coronary anatomy.











- Transfer Medications


Home Medications: 


                                        





Clonidine HCl 0.2 mg PO TID 11/19/16 


Atorvastatin Calcium [Lipitor 40 mg Tablet] 40 mg PO QHS 09/14/20 


Diclofenac Sodium 4 gm TP QIDP PRN 09/14/20 


Nifedipine [Nifedipine ER] 60 mg PO TID 09/14/20 


Sevelamer Carbonate [Renvela] 1,600 mg PO MEALS 09/14/20 








Transfer Medications: 


                               Current Medications





Acetaminophen (Tylenol 325 Mg Tablet)  650 mg PO Q6HP PRN


   PRN Reason: FOR PAIN


   Stop: 10/14/20 17:28


   Last Admin: 09/14/20 18:15 Dose:  650 mg


   Documented by: 


Albuterol/Ipratropium (Duoneb 3 Ml Ampul)  3 ml NEB RTQ3HP PRN


   PRN Reason: SHORTNESS OF BREATH


   Stop: 10/14/20 02:36


Atorvastatin Calcium (Lipitor 40 Mg Tablet)  40 mg PO QHS [facility]


   Stop: 10/14/20 21:59


   Last Admin: 09/14/20 22:23 Dose:  40 mg


   Documented by: 


Azithromycin (Zithromax 250 Mg Tablet)  250 mg PO DAILY [facility]


   Stop: 09/22/20 09:59


   Last Admin: 09/15/20 09:17 Dose:  250 mg


   Documented by: 


Clonidine (Catapres 0.2 Mg Tablet)  0.2 mg PO TID DANIEL


   Stop: 10/14/20 17:59


   Last Admin: 09/15/20 14:44 Dose:  0.2 mg


   Documented by: 


Dextrose (Dextrose Inj 50% Syringe (25 Gm/50 Ml))  12.5 gm IV PRN PRN; Protocol


   PRN Reason: FOR BG 50-69 IN ALERT PATIENT


   Stop: 10/14/20 11:26


Dextrose (Dextrose Inj 50% Syringe (25 Gm/50 Ml))  25 gm IV PRN PRN; Protocol


   PRN Reason: PER PROTOCOL


   Stop: 10/14/20 11:26


Glucagon (Glucagen Inj 1 Mg Vial)  1 mg IM PRN PRN; Protocol


   PRN Reason: Evaluate for BG < 70


   Stop: 10/14/20 11:26


Glucose (Glutose 40% Gel 15 Gm Tube)  15 gm PO PRN PRN; Protocol


   PRN Reason: FOR BG 50-69 IN ALERT PATIENT


   Stop: 10/14/20 11:26


Glucose (Glutose 40% Gel 15 Gm Tube)  30 gm PO PRN PRN; Protocol


   PRN Reason: FOR BG < 50 IN ALERT PATIENT 


   Stop: 10/14/20 11:26


Heparin Sodium (Porcine) (Heparin Inj 5,000 Units/Ml 1 Ml Vial)  5,000 unit 

SUBCUT Q8 [facility]


   Stop: 10/14/20 13:59


   Last Admin: 09/15/20 14:45 Dose:  5,000 unit


   Documented by: 


Hydralazine HCl (Apresoline Inj/Pf 20 Mg/1 Ml Sdv)  10 mg IV Q4HP PRN


   PRN Reason: GIVE FOR SBP >170


   Stop: 10/14/20 12:13


   Last Admin: 09/14/20 15:28 Dose:  10 mg


   Documented by: 


Vancomycin HCl 750 mg/ (Dextrose)  250 mls @ 166.667 mls/hr IV PDIA [facility]


   Stop: 09/23/20 17:59


Cefepime HCl (Maxipime Rtu 1 Gm/D5w 50 Ml Premix Bag)  1 gm in 50 mls @ 100 

mls/hr IV QHS [facility]


   Stop: 09/21/20 21:59


   Last Infusion: 09/14/20 23:00 Dose:  Infused


   Documented by: 


Sodium Chloride (Nacl 0.9% 1000 Ml Iv Soln)  1,000 mls @ 0 mls/hr IV .DIALYSIS 

PRN


   PRN Reason: THIS MED IS NOT "PRN"


   Stop: 09/16/20 23:59


Epoetin Lang-epbx 2,000 unit/Epoetin Lang-epbx 3,000 unit/Syringe  2 mls @ 0 

mls/hr IV .DIALYSIS PRN


   PRN Reason: THIS MED IS NOT "PRN"


   Stop: 09/16/20 23:59


Insulin Human Lispro (Humalog Insulin 100 Unit/1 Ml 3 Ml Vial)  0 - 12 unit 

SUBCUT ACHS DANIEL; Protocol


   Stop: 10/14/20 15:59


   Last Admin: 09/15/20 14:43 Dose:  Not Given


   Documented by: 


Lidocaine (Anecream 4% Tube 5 Gm)  1 applic TP ASDIR PRN


   PRN Reason: THIS MED IS NOT "PRN"


   Stop: 10/14/20 09:48


   Last Admin: 09/14/20 13:58 Dose:  1 applic


   Documented by: 


Nifedipine (Procardia Xl 30 Mg Tablet)  60 mg PO Q8 DANIEL


   Stop: 10/14/20 17:59


   Last Admin: 09/15/20 14:45 Dose:  60 mg


   Documented by: 


Sevelamer HCl (Renagel 800 Mg Tablet)  1,600 mg PO MEALS DANIEL


   Stop: 10/14/20 16:59


   Last Admin: 09/15/20 14:44 Dose:  1,600 mg


   Documented by: 











- Allergies


Allergies/Adverse Reactions: 


                                        





No Known Allergies Allergy (Verified 04/11/18 10:14)


   











- Diet/Activity


Discharge Diet: Cardiac, Diabetic, Other (Comments)





Hospital Course


Hospital Course: 





Patient was admitted secondary to profound hypoxia.  He was screened for COVID-

19 infection and is negative


She is on hemodialysis for end-stage renal disease


Her peak troponin was 1.08.  Since admission to the hospital with dialysis her 

symptoms have improved.





There was plans for further cardiac work-up including stress test and 

echocardiogram.  This was due to the fact that patient has not had any previous 

cardiac work-up.


Transthoracic echocardiogram done showed preserved ejection fraction with severe

 aortic stenosis.  This is a new finding for the patient.  Given presentation 

with pulmonary edema and elevated troponin I believe he needs additional work-up

 including cardiac catheterization and assessment of the aortic valve 

replacement.





Have discussed this with Dr. Peterson at [facility] cardiology who has been kind 

to accept the patient for transfer and further work-up.





Physical Exam


Vital Signs: 


                                        











Temp Pulse Resp BP Pulse Ox


 


 98.7 F   94   18   135/65 H  100 


 


 09/15/20 08:54  09/15/20 14:00  09/15/20 08:54  09/15/20 08:54  09/15/20 08:54








                                 Intake & Output











 09/14/20 09/15/20 09/16/20





 06:59 06:59 06:59


 


Intake Total 250 375 


 


Output Total 0 3100 


 


Balance 250 -2725 


 


Weight 63.4 kg 64.1 kg 











General appearance: PRESENT: no acute distress, cooperative, well-developed, 

well-nourished


Eye exam: PRESENT: EOMI


Mouth exam: PRESENT: moist


Neck exam: PRESENT: JVD


Respiratory exam: PRESENT: crackles, symmetrical, unlabored


Cardiovascular exam: PRESENT: +S1, +S2, systolic murmur


Pulses: PRESENT: normal radial pulses


Vascular exam: PRESENT: normal capillary refill


GI/Abdominal exam: PRESENT: soft


Rectal exam: PRESENT: deferred


Neurological exam: PRESENT: alert, awake, oriented to person, oriented to place,

 oriented to time, oriented to situation


Psychiatric exam: PRESENT: appropriate affect


Skin exam: PRESENT: dry, intact, normal color





Results


Laboratory Results: 


                                        





                                 09/15/20 04:56 





                                 09/15/20 04:56 





                                        











  09/15/20 09/15/20





  04:56 04:56


 


WBC   9.8


 


RBC   3.24 L


 


Hgb   10.0 L


 


Hct   29.9 L


 


MCV   92


 


MCH   30.8


 


MCHC   33.3


 


RDW   15.6 H


 


Plt Count   173


 


Seg Neutrophils %   92.0 H


 


Sodium  139.7 


 


Potassium  4.5 


 


Chloride  99 


 


Carbon Dioxide  29 


 


Anion Gap  12 


 


BUN  31 H 


 


Creatinine  6.54 H 


 


Est GFR (African Amer)  7 L 


 


Glucose  127 H 


 


Calcium  9.9 


 


Ferritin  1060.00 H 


 


Total Bilirubin  0.9 


 


AST  24 


 


Alkaline Phosphatase  98 


 


C-Reactive Protein  46.0 H 


 


Total Protein  7.0 


 


Albumin  4.0 








                                        





09/13/20 19:10   Blood   Blood Culture (PCR) - Final


                            Staphylococcus Species





                                        











  09/13/20 09/13/20 09/14/20





  18:20 18:20 00:43


 


Creatine Kinase  75  


 


CK-MB (CK-2)   2.27 


 


Troponin I   0.187  0.846














  09/14/20 09/14/20 09/14/20





  00:43 00:43 06:33


 


Creatine Kinase  76   71


 


CK-MB (CK-2)   3.59 


 


Troponin I   Cancelled 














  09/14/20 09/14/20 09/14/20





  06:33 12:49 12:49


 


Creatine Kinase   66 


 


CK-MB (CK-2)  2.77   2.34


 


Troponin I  1.080   0.932











EKG Comments: 





Transthoracic echocardiogram 9/15/2020


Preserved ejection fraction


Severe aortic stenosis


Moderate aortic regurgitation


Mild mitral regurgitation








Twelve-lead EKG shows sinus rhythm with left ventricular hypertrophy and 

repolarization abnormality


Peak troponin I 0.08


COVID-19 test is negative


Impressions: 


                                        





Chest X-Ray  09/14/20 00:00


IMPRESSION:  Marked improvement status post dialysis.


 














Plan


Discharge Plan: 





Plan to transfer to [place] for further evaluation


Elevated troponin; ischemic evaluation with cardiac utilization given increased 

pretest likelihood of coronary artery disease


This is also necessary given finding on echocardiogram of severe aortic 

stenosis.


Time Spent: Greater than 30 Minutes - Phone calls were made to arrange for 

transfer Appropriate paperwork was filled out. Transfer summary was dictated.  

Conversation with patient and nursing staff.  Total time spent was approximately

 45 minutes.

## 2020-09-15 NOTE — PDOC PROGRESS REPORT
Subjective


Progress Note for:: 09/15/20


Subjective:: 








Patient is feeling better still require BiPAP at 80%


Patients have a hemodialysis done yesterday


Patient's denied any chest pain 


Patient's CT scan of the chest unable to do it due to the BiPAP not very stable 

enough


Patient other than that no new complaints of COVID test is still pending


Reason For Visit: 


RESPIRATORY FAILURE?CAUSE CHF,PNEUMONIA,ESRD








Physical Exam


Vital Signs: 


                                        











Temp Pulse Resp BP Pulse Ox


 


 98.2 F   95   10 L  137/76 H  98 


 


 09/15/20 07:25  09/15/20 07:00  09/15/20 04:05  09/15/20 04:04  09/15/20 04:05








                                 Intake & Output











 09/14/20 09/15/20 09/16/20





 06:59 06:59 06:59


 


Intake Total 250 375 


 


Output Total 0 3100 


 


Balance 250 -2725 


 


Weight 63.4 kg 64.1 kg 











General appearance: PRESENT: no acute distress, well-developed, well-nourished


Head exam: PRESENT: atraumatic, normocephalic


Eye exam: PRESENT: conjunctiva pink, EOMI, PERRLA.  ABSENT: scleral icterus


Ear exam: PRESENT: normal external ear exam


Mouth exam: PRESENT: moist, tongue midline


Neck exam: PRESENT: full ROM.  ABSENT: carotid bruit, JVD, lymphadenopathy, 

thyromegaly


Respiratory exam: PRESENT: decreased breath sounds


Cardiovascular exam: PRESENT: RRR.  ABSENT: diastolic murmur, rubs, systolic 

murmur


Vascular exam: PRESENT: normal capillary refill


GI/Abdominal exam: PRESENT: normal bowel sounds, soft.  ABSENT: distended, 

guarding, mass, organolmegaly, rebound, tenderness


Rectal exam: PRESENT: deferred


Neurological exam: PRESENT: alert, awake, oriented to person, oriented to place,

oriented to time, oriented to situation.  ABSENT: motor sensory deficit


Psychiatric exam: PRESENT: appropriate affect, normal mood.  ABSENT: homicidal i

deation, suicidal ideation


Skin exam: PRESENT: dry, intact, warm.  ABSENT: cyanosis, rash





Results


Laboratory Results: 


                                        





                                 09/15/20 04:56 





                                 09/15/20 04:56 





                                        











  09/15/20 09/15/20





  04:56 04:56


 


WBC   9.8


 


RBC   3.24 L


 


Hgb   10.0 L


 


Hct   29.9 L


 


MCV   92


 


MCH   30.8


 


MCHC   33.3


 


RDW   15.6 H


 


Plt Count   173


 


Seg Neutrophils %   92.0 H


 


Sodium  139.7 


 


Potassium  4.5 


 


Chloride  99 


 


Carbon Dioxide  29 


 


Anion Gap  12 


 


BUN  31 H 


 


Creatinine  6.54 H 


 


Est GFR (African Amer)  7 L 


 


Glucose  127 H 


 


Calcium  9.9 


 


Ferritin  1060.00 H 


 


Total Bilirubin  0.9 


 


AST  24 


 


Alkaline Phosphatase  98 


 


C-Reactive Protein  46.0 H 


 


Total Protein  7.0 


 


Albumin  4.0 








                                        





09/13/20 19:10   Blood   Blood Culture (PCR) - Final


                            Staphylococcus Species





                                        











  09/13/20 09/13/20 09/14/20





  18:20 18:20 00:43


 


Creatine Kinase  75  


 


CK-MB (CK-2)   2.27 


 


Troponin I   0.187  0.846














  09/14/20 09/14/20 09/14/20





  00:43 00:43 06:33


 


Creatine Kinase  76   71


 


CK-MB (CK-2)   3.59 


 


Troponin I   Cancelled 














  09/14/20 09/14/20 09/14/20





  06:33 12:49 12:49


 


Creatine Kinase   66 


 


CK-MB (CK-2)  2.77   2.34


 


Troponin I  1.080   0.932














Assessment & Plan





- Diagnosis


(1) Respiratory distress


Is this a current diagnosis for this admission?: Yes   


Plan: 


We will get the ABG once the patient is stable we will get the CT of the chest








(2) End stage renal disease


Is this a current diagnosis for this admission?: Yes   


Plan: 


Continues the hemodialysis per nephrology








(3) Type 2 diabetes mellitus


Qualifiers: 


   Diabetes mellitus long term insulin use: without long term use   Chronic 

kidney disease stage: on chronic dialysis 


Is this a current diagnosis for this admission?: Yes   


Plan: 


Continue sliding-scale








(4) Essential hypertension


Is this a current diagnosis for this admission?: Yes   


Plan: 


Currently all stable








(5) Mixed hyperlipidemia


Is this a current diagnosis for this admission?: Yes   





(6) CHF (congestive heart failure)


Qualifiers: 


   Heart failure type: unspecified   Heart failure chronicity: acute   Qualified

Code(s): I50.9 - Heart failure, unspecified   


Is this a current diagnosis for this admission?: Yes   


Plan: 


Follow-up with cardiology








(7) Pneumonia


Qualifiers: 


   Pneumonia type: due to unspecified organism   Laterality: right   Lung 

location: unspecified part of lung   Qualified Code(s): J18.9 - Pneumonia, 

unspecified organism   


Is this a current diagnosis for this admission?: Yes   


Plan: 


Continues IV antibiotics continues on the BiPAP consult pulmonary








(8) Elevated troponin


Is this a current diagnosis for this admission?: Yes   





- Time


Time Spent with patient: 25-34 minutes


Level of Care: IMCU


Medications reviewed and adjusted accordingly: Yes


Anticipated discharge: Home


Anticipated DC Timeframe: within 72 hours





- Plan Summary


Plan Summary: 





Continues to current medications


Discussed with the patient's daughter Kathy

## 2020-09-15 NOTE — PDOC PROGRESS REPORT
Subjective


Progress Note for:: 09/15/20


Subjective:: 





Patient symptoms have improved remarkably.  She reports good improvement in 

dyspnea with no present ongoing symptoms.  No chest pain is reported.





Reason For Visit: 


RESPIRATORY FAILURE?CAUSE CHF,PNEUMONIA,ESRD








Physical Exam


Vital Signs: 


                                        











Temp Pulse Resp BP Pulse Ox


 


 98.7 F   87   18   135/65 H  100 


 


 09/15/20 08:54  09/15/20 08:54  09/15/20 08:54  09/15/20 08:54  09/15/20 08:54








                                 Intake & Output











 09/14/20 09/15/20 09/16/20





 06:59 06:59 06:59


 


Intake Total 250 375 


 


Output Total 0 3100 


 


Balance 250 -2725 


 


Weight 63.4 kg 64.1 kg 











General appearance: PRESENT: no acute distress, cooperative, disheveled, well-

developed, well-nourished


Head exam: PRESENT: atraumatic, normocephalic


Eye exam: PRESENT: EOMI


Mouth exam: PRESENT: moist


Respiratory exam: PRESENT: symmetrical, unlabored


Cardiovascular exam: PRESENT: +S1, +S2


Pulses: PRESENT: normal radial pulses


GI/Abdominal exam: PRESENT: soft


Rectal exam: PRESENT: deferred


Neurological exam: PRESENT: alert, awake, oriented to person, oriented to place,

oriented to time, oriented to situation


Psychiatric exam: PRESENT: appropriate affect


Skin exam: PRESENT: dry, intact, normal color





Results


Laboratory Results: 


                                        





                                 09/15/20 04:56 





                                 09/15/20 04:56 





                                        











  09/15/20 09/15/20





  04:56 04:56


 


WBC   9.8


 


RBC   3.24 L


 


Hgb   10.0 L


 


Hct   29.9 L


 


MCV   92


 


MCH   30.8


 


MCHC   33.3


 


RDW   15.6 H


 


Plt Count   173


 


Seg Neutrophils %   92.0 H


 


Sodium  139.7 


 


Potassium  4.5 


 


Chloride  99 


 


Carbon Dioxide  29 


 


Anion Gap  12 


 


BUN  31 H 


 


Creatinine  6.54 H 


 


Est GFR (African Amer)  7 L 


 


Glucose  127 H 


 


Calcium  9.9 


 


Ferritin  1060.00 H 


 


Total Bilirubin  0.9 


 


AST  24 


 


Alkaline Phosphatase  98 


 


C-Reactive Protein  46.0 H 


 


Total Protein  7.0 


 


Albumin  4.0 








                                        





09/13/20 19:10   Blood   Blood Culture (PCR) - Final


                            Staphylococcus Species





                                        











  09/13/20 09/13/20 09/14/20





  18:20 18:20 00:43


 


Creatine Kinase  75  


 


CK-MB (CK-2)   2.27 


 


Troponin I   0.187  0.846














  09/14/20 09/14/20 09/14/20





  00:43 00:43 06:33


 


Creatine Kinase  76   71


 


CK-MB (CK-2)   3.59 


 


Troponin I   Cancelled 














  09/14/20 09/14/20 09/14/20





  06:33 12:49 12:49


 


Creatine Kinase   66 


 


CK-MB (CK-2)  2.77   2.34


 


Troponin I  1.080   0.932











EKG Comments: 





Twelve-lead EKG 9/13/2020.  Independently viewed by me.


Sinus tachycardia 115 bpm, PAC, left ventricular hypertrophy with repolarization

abnormality











COVID-19 is not detected





Troponin I 0.08 peak


Troponin repeat 0.932





Impressions: 


                                        





Chest X-Ray  09/14/20 00:00


IMPRESSION:  Marked improvement status post dialysis.


 














Assessment & Plan





- Diagnosis


(1) CHF (congestive heart failure)


Qualifiers: 


   Heart failure type: unspecified   Heart failure chronicity: acute   Qualified

Code(s): I50.9 - Heart failure, unspecified   


Is this a current diagnosis for this admission?: Yes   


Plan: 


Volume status appears to have improved clinically


Patient may require additional fluid removal at dialysis


Present data speaks against infectious etiology for patient's symptoms.





Given the above it may be reasonable to proceed with cardiac testing including 

transthoracic echocardiogram


Given elevation in troponin it may be reasonable to re-stratify the patient also

with a pharmacological nuclear stress test











(2) Elevated troponin


Is this a current diagnosis for this admission?: Yes   


Plan: 


Elevated troponins have been in the setting of respiratory distress and hypoxia


However significantly increased pretest likelihood of coronary artery disease 

given the presence of end-stage renal disease and risk factors for coronary 

artery disease such as hypertension


With this in mind it may be reasonable to stratify the patient with a pharmaco

logical nuclear stress test.

## 2020-09-15 NOTE — RADIOLOGY REPORT (SQ)
EXAM DESCRIPTION:  CHEST SINGLE VIEW



IMAGES COMPLETED DATE/TIME:  9/15/2020 6:04 am



REASON FOR STUDY:  post dialysis chest xray



COMPARISON:  9/13/2020



NUMBER OF VIEWS:  One view.



TECHNIQUE:  Single frontal radiographic image of the chest acquired.



LIMITATIONS:  None.



FINDINGS:  LUNGS AND PLEURA: Improved aeration with mild residual airspace disease in the lower lobes
.

MEDIASTINUM AND HEART: Stable heart size and mediastinal structures.

BONY STRUCTURES: No acute findings.

HARDWARE: None.

OTHER: Left subclavian and axillary stents.



IMPRESSION:  Marked improvement status post dialysis.



TECHNICAL DOCUMENTATION:  JOB ID:  [id number]



Reading location - IP/workstation name: [email or web address]

## 2020-09-15 NOTE — CDI QUERY
CDI Query


CDI Review: 





We are seeking further clarification of documentation to reflect the severity of

illness of your patient.





Noted in the H&P:





Patient is never had any heart conditions except some mild diastolic 

dysfunctions at this point patient's troponin was elevated most likely due to 

the CHF decided to consult the cardiology and order the echocardiogram





Per Cardiology Consult:





CHF (congestive heart failure)


Qualifiers: 


Heart failure type: unspecified   Heart failure chronicity: acute   Qualified 

Code(s): I50.9 - Heart failure, unspecified   


Is this a current diagnosis for this admission?: Yes   


Plan: 


There are symptoms suggestive of congestive heart failure including worsening 

dyspnea and abnormal chest x-ray





ECHO: 


EF 55-60 %


Doppler measurements suggest impaired left ventricular relaxation which is 

associated with grade I/IV or mild diastolic dysfunction.





Based on your medical judgment, can you further clarify in the progress notes 

both of the following: 





Type of heart failure: 





 >Systolic 


 >Diastolic 


 > Combined systolic/diastolic 


 > Other (please specify) 


 > None of the above / Not applicable





 - AND  





Severity of heart failure





 > Acute, exacerbation, or decompensation


 >Chronic


 >  Acute on chronic 


 > Other (please specify)  None of the above / Not applicable





Thank you for your consideration.





RACHEL Singh RN


Clinical 


Physician Advisor


[phone]


[email or web address]

## 2020-09-15 NOTE — PDOC CONSULTATION
Consultation


Consult Date: 09/14/20


Attending physician:: YORDAN ADLER


Provider Consulted: SANDER PETERS


Consult reason:: dypsnea





History of Present Illness


Admission Date/PCP: 


  09/14/20 02:17





  YORDAN ADLER MD





History of Present Illness: 


ROHIT KNIGHT is a 79 year old female with multiple medical problems that 

presented with increasing shortness of breath the previous 48 hours is a history

of CHF as well as chronic renal failure with every other day dialysis.  She de

nies fever chills or productive cough hemoptysis PPD status is negative dates 

unknown she denies having history of chronic lung disease as a child or 

adolescent.  She admits to exposure to passive smoke as a child as well as an 

adult.  She denies that she is ever smoked.  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 

tobacco feels worse using exposed large amounts of dust as well as some 

chemicals.  She has no pets no recent travel she denies anginal-like chest pain.

 Sleeps on 3 pillows no PND no nocturnal cough intermittent edema she is unaware

she unaware of any snoring and she admits to some daytime somnolence.








Past Medical History


Cardiac Medical History: Reports: Hyperlipidema, Hypertension


Endocrine Medical History: Reports: Diabetes Mellitus Type 2


Renal/ Medical History: Reports: Chronic Kidney Disease, End Stage Renal 

Disease


Psychiatric Medical History: 


   Denies: Depression


Traumatic Medical History: 


   Denies: Gunshot Wound


Hematology: 


   Denies: Sickle Cell Disease





Past Surgical History


Past Surgical History: Reports: Tubal Ligation, Other - Endarterectomy





Social History


Smoking Status: Former Smoker


Frequency of Alcohol Use: None


Hx Recreational Drug Use: No


Hx Prescription Drug Abuse: No





Family History


Family History: CAD, Hypertension


Parental Family History Reviewed: Yes


Children Family History Reviewed: Yes


Sibling(s) Family History Reviewed.: Yes





Medication/Allergy


Home Medications: 








Clonidine HCl 0.2 mg PO TID 11/19/16 


Atorvastatin Calcium [Lipitor 40 mg Tablet] 40 mg PO QHS 09/14/20 


Diclofenac Sodium 4 gm TP QIDP PRN 09/14/20 


Nifedipine [Nifedipine ER] 60 mg PO TID 09/14/20 


Sevelamer Carbonate [Renvela] 1,600 mg PO MEALS 09/14/20 








Allergies/Adverse Reactions: 


                                        





No Known Allergies Allergy (Verified 04/11/18 10:14)


   











Review of Systems


All systems: reviewed and no additional remarkable complaints except as stated





Physical Exam


Vital Signs: 


                                        











Temp Pulse Resp BP Pulse Ox


 


 98.4 F   94   20   174/92 H  100 


 


 09/14/20 11:58  09/14/20 11:58  09/14/20 11:58  09/14/20 11:58  09/14/20 11:58








                                 Intake & Output











 09/13/20 09/14/20 09/15/20





 06:59 06:59 06:59


 


Intake Total  250 


 


Output Total  0 0


 


Balance  250 0


 


Weight  63.4 kg 63.4 kg











General appearance: PRESENT: no acute distress, cooperative, disheveled, well-

developed, well-nourished


Head exam: PRESENT: atraumatic, normocephalic


Eye exam: PRESENT: conjunctiva pale, EOMI.  ABSENT: nystagmus, periorbital 

swelling


Mouth exam: PRESENT: dry mucosa, neck supple, tongue midline


Neck exam: ABSENT: carotid bruit, full ROM, JVD, lymphadenopathy, meningismus, 

tenderness, thyromegaly, tracheal deviation, tracheostomy, other


Respiratory exam: PRESENT: decreased breath sounds, prolonged expiratory phas, 

rales, rhonchi, symmetrical, unlabored.  ABSENT: stridor, tachypnea, wheezes


Cardiovascular exam: PRESENT: RRR, +S1, +S2


Pulses: PRESENT: normal radial pulses


GI/Abdominal exam: PRESENT: soft.  ABSENT: distended, guarding, mass, rebound, 

tenderness


Extremities exam: ABSENT: calf tenderness, clubbing, joint swelling, tenderness


Musculoskeletal exam: ABSENT: deformity, dislocation


Neurological exam: PRESENT: alert, awake


Psychiatric exam: PRESENT: appropriate affect


Skin exam: PRESENT: dry, warm





Results


Laboratory Results: 


                                        





                                 09/13/20 18:20 





                                 09/13/20 18:20 





                                        











  09/13/20 09/13/20 09/13/20





  18:20 18:20 18:20


 


WBC  9.5  


 


RBC  3.03 L  


 


Hgb  9.5 L  


 


Hct  28.5 L  


 


MCV  94  


 


MCH  31.2  


 


MCHC  33.3  


 


RDW  15.9 H  


 


Plt Count  193  


 


Seg Neutrophils %  Not Reportable  


 


Carbonic Acid   


 


HCO3/H2CO3 Ratio   


 


ABG pH   


 


ABG pCO2   


 


ABG pO2   


 


ABG HCO3   


 


ABG O2 Saturation   


 


ABG Base Excess   


 


FiO2   


 


Sodium   141.7 


 


Potassium   3.1 L 


 


Chloride   100 


 


Carbon Dioxide   23 


 


Anion Gap   19 


 


BUN   32 H 


 


Creatinine   10.10 H 


 


Est GFR (African Amer)   4 L 


 


Glucose   146 H 


 


Lactic Acid    6.2 H


 


Calcium   9.4 


 


Magnesium   


 


Ferritin   


 


Total Bilirubin   0.9 


 


AST   23 


 


Alkaline Phosphatase   122 


 


Total Protein   7.5 


 


Albumin   4.4 














  09/13/20 09/13/20 09/14/20





  18:20 21:19 00:43


 


WBC   


 


RBC   


 


Hgb   


 


Hct   


 


MCV   


 


MCH   


 


MCHC   


 


RDW   


 


Plt Count   


 


Seg Neutrophils %   


 


Carbonic Acid   1.37 H 


 


HCO3/H2CO3 Ratio   18:1 


 


ABG pH   7.37 


 


ABG pCO2   45.6 H 


 


ABG pO2   84.4 


 


ABG HCO3   25.8 H 


 


ABG O2 Saturation   96.0 


 


ABG Base Excess   0.4 


 


FiO2   50% 


 


Sodium   


 


Potassium   


 


Chloride   


 


Carbon Dioxide   


 


Anion Gap   


 


BUN   


 


Creatinine   


 


Est GFR (African Amer)   


 


Glucose   


 


Lactic Acid    1.3


 


Calcium   


 


Magnesium   


 


Ferritin  1100.00 H  


 


Total Bilirubin   


 


AST   


 


Alkaline Phosphatase   


 


Total Protein   


 


Albumin   














  09/14/20





  00:43


 


WBC 


 


RBC 


 


Hgb 


 


Hct 


 


MCV 


 


MCH 


 


MCHC 


 


RDW 


 


Plt Count 


 


Seg Neutrophils % 


 


Carbonic Acid 


 


HCO3/H2CO3 Ratio 


 


ABG pH 


 


ABG pCO2 


 


ABG pO2 


 


ABG HCO3 


 


ABG O2 Saturation 


 


ABG Base Excess 


 


FiO2 


 


Sodium 


 


Potassium 


 


Chloride 


 


Carbon Dioxide 


 


Anion Gap 


 


BUN 


 


Creatinine 


 


Est GFR (African Amer) 


 


Glucose 


 


Lactic Acid 


 


Calcium 


 


Magnesium  2.3


 


Ferritin 


 


Total Bilirubin 


 


AST 


 


Alkaline Phosphatase 


 


Total Protein 


 


Albumin 








                                        











  09/13/20 09/13/20 09/14/20





  18:20 18:20 00:43


 


Creatine Kinase  75  


 


CK-MB (CK-2)   2.27 


 


Troponin I   0.187  0.846














  09/14/20 09/14/20 09/14/20





  00:43 00:43 06:33


 


Creatine Kinase  76   71


 


CK-MB (CK-2)   3.59 


 


Troponin I   Cancelled 














  09/14/20





  06:33


 


Creatine Kinase 


 


CK-MB (CK-2)  2.77


 


Troponin I  1.080











Impressions: 


                                        





Chest X-Ray  09/13/20 18:05


IMPRESSION:  Findings suggest progressive CHF exacerbation.  Superimposed 

infectious process is not excluded.


 














Assessment & Plan





- Diagnosis


(1) CHF (congestive heart failure)


Qualifiers: 


   Heart failure type: unspecified   Heart failure chronicity: acute   Qualified

 Code(s): I50.9 - Heart failure, unspecified   


Is this a current diagnosis for this admission?: Yes   


Plan: 


Clinical and radiographic improvement








(2) End stage renal disease


Is this a current diagnosis for this admission?: Yes   


Plan: 


Hemodialysis every other day








(3) Pneumonia


Qualifiers: 


   Pneumonia type: due to unspecified organism   Laterality: right   Lung 

location: unspecified part of lung   Qualified Code(s): J18.9 - Pneumonia, 

unspecified organism   


Is this a current diagnosis for this admission?: Yes   


Plan: 


Patient's x-ray is improving rapidly with dialysis she is afebrile course at her

 age fever is not the most reliable plan she does not have a leukocytosis and 

has a normal left shift her cough is always foamy or clear no yellow-green or 

other purulent material will follow with you but do not believe she has a 

pneumonia and where she has a bronchitis








(4) Respiratory distress


Is this a current diagnosis for this admission?: Yes   


Plan: 


Stable at this time significantly improved with dialysis and positive pressure 

ventilation








- Time


Time Spent with patient: 





55 minutes

## 2020-09-15 NOTE — XCELERA REPORT
[address] [address]

                               Tel: [phone]

                               Fax: [phone]



                      Transthoracic Echocardiogram Report

_______________________________________________________________________________



Name: ROHIT KNIGHT

MRN: [id number]                           Age: 79 yrs

Gender: Female                            : 1940

Patient Status: Inpatient                 Patient Location: [address] [place], [place] [address]

Account #: [id number]

Study Date: 09/15/2020 10:36 AM

History: NSTEMI

CHF

Accession #: [id number]

_______________________________________________________________________________



Height: 63 in        Weight: 141 lb        BSA: 1.7 m2

_______________________________________________________________________________

Procedure: A complete two-dimensional transthoracic echocardiogram was

performed (2D, M-mode, spectral and color flow Doppler). The study was

technically adequate with some images being suboptimal in quality.

Reason For Study: CHF





Ordering Physician: YORDAN ADLER

Performed By: Joe Pham



_______________________________________________________________________________



Interpretation Summary

Left ventricular systolic function is normal.

The Ejection Fraction estimate is 55-60%

The right ventricle is normal in size and function.

There is a mild amount of mitral regurgitation

There is moderate to severe aortic stenosis

JAMILA(VTI)=0.89 cm2

Vmax=4.41 m/s

Mean gradient 47 mm Hg

There is a mild amount of aortic regurgitation

There is a mild to moderate amount of tricuspid regurgitation

There is moderate pulmonary hypertension by echo

There is no pericardial effusion.



MMode/2D Measurements & Calculations

RVDd: 3.0 cm        LVIDd: 4.4 cm FS: 30.1 %           Ao root diam: 2.9 cm

IVSd: 1.3 cm        LVIDs: 3.1 cm EDV(Teich): 87.9 ml

                                                       Ao root area: 6.5 cm2

                    LVPWd: 1.3 cm ESV(Teich): 37.3 ml  LA dimension: 4.5 cm

                                  EF(Teich): 57.6 %

        _______________________________________________________________

LVOT diam: 2.0 cm

LVOT area: 3.0 cm2





Doppler Measurements & Calculations

MV E max latosha:      MV P1/2t max latosha:    Ao V2 max:        AI max latosha:

185.9 cm/sec       160.6 cm/sec         441.1 cm/sec      419.4 cm/sec

MV A max latosha:      MV P1/2t: 69.8 msec  Ao max PG:        AI max P.3 cm/sec       MVA(P1/2t): 3.2 cm2  77.9 mmHg         70.4 mmHg

MV E/A: 1.1        MV dec slope:        Ao V2 mean:       AI dec slope:

                                        329.0 cm/sec      390.1 cm/sec2

                   674.0 cm/sec2        Ao mean PG:       AI P1/2t:

                   MV dec time: 0.21 sec47.0 mmHg         314.9 msec

                                        Ao V2 VTI:

                                        101.0 cm

                                        JAMILA(I,D):

                                        0.74 cm2

                                        JAMIAL(V,D):



                                        0.78 cm2

        _______________________________________________________________

LV V1 max PG:      SV(LVOT): 74.6 ml    PA V2 max:        TR max latosha:

5.4 mmHg                                114.1 cm/sec      377.3 cm/sec

LV V1 mean PG:                          PA max PG:        TR max PG:

3.0 mmHg                                5.2 mmHg          57.0 mmHg

LV V1 max:

115.3 cm/sec

LV V1 mean:

78.9 cm/sec

LV V1 VTI: 25.0 cm

LV dP/dt:

3029 mmHg/s



        _______________________________________________________________

AV P1/2t-pr_phl:   MV P1/2t-pr_phl:

314.9 msec         69.8 msec



Left Ventricle

The left ventricle is grossly normal size. There is moderate to severe

concentric left ventricular hypertrophy. Left ventricular systolic function is

normal. The Ejection Fraction estimate is 55-60%. Doppler measurements suggest

impaired left ventricular relaxation, which is associated with grade I/IV or

mild diastolic dysfunction. No regional wall motion abnormalities noted.



Right Ventricle

The right ventricle is normal in size and function.





Atria

The right atrium is mildly dilated. The left atrium is mildly dilated. The

interatrial septum is intact with no evidence for an atrial septal defect.

There is no Doppler evidence for an interatrial shunt.



Mitral Valve

Calcified mitral apparatus. There is no mitral valve stenosis. There is a mild

amount of mitral regurgitation.



Aortic Valve

The aortic valve is sclerotic and shows some degree of functional abnormality.

The aortic valve is moderately calcified. There is moderate to severe aortic

stenosis. JAMILA(VTI)=0.89 cm2

Vmax=4.41 m/s

Mean gradient 47 mm Hg. There is a mild amount of aortic regurgitation.



Tricuspid Valve

The tricuspid valve is not well visualized, but is grossly normal. There is no

tricuspid stenosis. There is a mild to moderate amount of tricuspid

regurgitation. There is moderate pulmonary hypertension by echo. Best

estimated right ventricular systolic pressure is elevated at 50-60mmHg.





Pulmonic Valve

The pulmonic valve is not well visualized. There is a mild amount of pulmonic

regurgitation.



Great Vessels

The inferior vena cava appeared dilated and decreased < 50% with respiration

(RAP 15-20 mmHg).



Effusions

There is no pericardial effusion.





_______________________________________________________________________________

_______________________________________________________________________________

Electronically signed by:      Ryan Aguero      on 09/15/2020 03:03 PM



CC: YORDAN ADLER Anil

## 2020-09-16 LAB
ADD MANUAL DIFF: NO
ANION GAP SERPL CALC-SCNC: 13 MMOL/L (ref 5–19)
BASOPHILS # BLD AUTO: 0.1 10^3/UL (ref 0–0.2)
BASOPHILS NFR BLD AUTO: 0.8 % (ref 0–2)
BUN SERPL-MCNC: 44 MG/DL (ref 7–20)
CALCIUM: 9.2 MG/DL (ref 8.4–10.2)
CHLORIDE SERPL-SCNC: 96 MMOL/L (ref 98–107)
CO2 SERPL-SCNC: 29 MMOL/L (ref 22–30)
EOSINOPHIL # BLD AUTO: 0.1 10^3/UL (ref 0–0.6)
EOSINOPHIL NFR BLD AUTO: 1.1 % (ref 0–6)
ERYTHROCYTE [DISTWIDTH] IN BLOOD BY AUTOMATED COUNT: 15.3 % (ref 11.5–14)
GLUCOSE SERPL-MCNC: 113 MG/DL (ref 75–110)
HCT VFR BLD CALC: 29.2 % (ref 36–47)
HEPATITIS C VIRUS ANTIBODY: 0.1 S/CO RATIO (ref 0–0.9)
HGB BLD-MCNC: 9.9 G/DL (ref 12–15.5)
LYMPHOCYTES # BLD AUTO: 1 10^3/UL (ref 0.5–4.7)
LYMPHOCYTES NFR BLD AUTO: 11.6 % (ref 13–45)
MCH RBC QN AUTO: 31.5 PG (ref 27–33.4)
MCHC RBC AUTO-ENTMCNC: 34 G/DL (ref 32–36)
MCV RBC AUTO: 93 FL (ref 80–97)
MONOCYTES # BLD AUTO: 0.6 10^3/UL (ref 0.1–1.4)
MONOCYTES NFR BLD AUTO: 6.6 % (ref 3–13)
NEUTROPHILS # BLD AUTO: 7.1 10^3/UL (ref 1.7–8.2)
NEUTS SEG NFR BLD AUTO: 79.9 % (ref 42–78)
PLATELET # BLD: 172 10^3/UL (ref 150–450)
POTASSIUM SERPL-SCNC: 3.8 MMOL/L (ref 3.6–5)
RBC # BLD AUTO: 3.15 10^6/UL (ref 3.72–5.28)
TOTAL CELLS COUNTED % (AUTO): 100 %
WBC # BLD AUTO: 8.9 10^3/UL (ref 4–10.5)

## 2020-09-16 PROCEDURE — 5A1D70Z PERFORMANCE OF URINARY FILTRATION, INTERMITTENT, LESS THAN 6 HOURS PER DAY: ICD-10-PCS | Performed by: INTERNAL MEDICINE

## 2020-09-16 RX ADMIN — NIFEDIPINE SCH MG: 30 TABLET, FILM COATED, EXTENDED RELEASE ORAL at 14:56

## 2020-09-16 RX ADMIN — INSULIN LISPRO SCH: 100 INJECTION, SOLUTION INTRAVENOUS; SUBCUTANEOUS at 16:02

## 2020-09-16 RX ADMIN — SEVELAMER HYDROCHLORIDE SCH MG: 800 TABLET, FILM COATED PARENTERAL at 17:05

## 2020-09-16 RX ADMIN — CEFEPIME HYDROCHLORIDE SCH MLS/HR: 1 INJECTION, SOLUTION INTRAVENOUS at 22:43

## 2020-09-16 RX ADMIN — INSULIN LISPRO SCH: 100 INJECTION, SOLUTION INTRAVENOUS; SUBCUTANEOUS at 22:47

## 2020-09-16 RX ADMIN — ATORVASTATIN CALCIUM SCH: 40 TABLET, FILM COATED ORAL at 22:52

## 2020-09-16 RX ADMIN — NIFEDIPINE SCH: 30 TABLET, FILM COATED, EXTENDED RELEASE ORAL at 22:55

## 2020-09-16 RX ADMIN — SEVELAMER HYDROCHLORIDE SCH MG: 800 TABLET, FILM COATED PARENTERAL at 14:57

## 2020-09-16 RX ADMIN — INSULIN LISPRO SCH: 100 INJECTION, SOLUTION INTRAVENOUS; SUBCUTANEOUS at 12:30

## 2020-09-16 RX ADMIN — HEPARIN SODIUM SCH UNIT: 5000 INJECTION, SOLUTION INTRAVENOUS; SUBCUTANEOUS at 22:45

## 2020-09-16 RX ADMIN — HEPARIN SODIUM SCH UNIT: 5000 INJECTION, SOLUTION INTRAVENOUS; SUBCUTANEOUS at 14:57

## 2020-09-16 RX ADMIN — CLONIDINE HYDROCHLORIDE SCH MG: 0.2 TABLET ORAL at 14:56

## 2020-09-16 RX ADMIN — INSULIN LISPRO SCH: 100 INJECTION, SOLUTION INTRAVENOUS; SUBCUTANEOUS at 08:28

## 2020-09-16 RX ADMIN — HEPARIN SODIUM SCH UNIT: 5000 INJECTION, SOLUTION INTRAVENOUS; SUBCUTANEOUS at 05:32

## 2020-09-16 RX ADMIN — CLONIDINE HYDROCHLORIDE SCH MG: 0.2 TABLET ORAL at 09:12

## 2020-09-16 RX ADMIN — NIFEDIPINE SCH MG: 30 TABLET, FILM COATED, EXTENDED RELEASE ORAL at 05:31

## 2020-09-16 RX ADMIN — SEVELAMER HYDROCHLORIDE SCH MG: 800 TABLET, FILM COATED PARENTERAL at 09:12

## 2020-09-16 RX ADMIN — CLONIDINE HYDROCHLORIDE SCH MG: 0.2 TABLET ORAL at 17:05

## 2020-09-16 RX ADMIN — LIDOCAINE PRN APPLIC: 40 CREAM TOPICAL at 05:57

## 2020-09-16 NOTE — PDOC PROGRESS REPORT
Subjective


Progress Note for:: 09/16/20


Subjective:: 





Patient is currently doing well


No chest pain no short of breath


Patient currently on room air


No fever no chills


As per discussed with the patient and her daughter about the Dr. Aguero make 

arrangement to go to the [facility] for the aortic valve evaluations while [facility] already have a seen the Dr. Jason in the [facility] have appointment to 

see him for further evaluations and patient serial echo was done by him for the 

last several months wants to prefer to go over there when scheduled appointment


Discussed with the patient and her daughter about needs to be a more urgently 

need to see instead of waiting for a month and will help the patient's to go to 

the next week to see him we will repeat Dr. Jason did not reply back yet


Patient's otherwise really wants to go home


Patient's denied any chest pain no short of breath


Reason For Visit: 


RESPIRATORY FAILURE?CAUSE CHF,PNEUMONIA,ESRD








Physical Exam


Vital Signs: 


                                        











Temp Pulse Resp BP Pulse Ox


 


 98.8 F   95   17   124/66   95 


 


 09/16/20 07:51  09/16/20 07:51  09/16/20 07:51  09/16/20 07:51  09/16/20 07:51








                                 Intake & Output











 09/15/20 09/16/20 09/17/20





 06:59 06:59 06:59


 


Intake Total 375 410 


 


Output Total 3100  


 


Balance -2725 410 


 


Weight 64.1 kg 63.8 kg 











General appearance: PRESENT: no acute distress, well-developed, well-nourished


Head exam: PRESENT: atraumatic, normocephalic


Eye exam: PRESENT: conjunctiva pink, EOMI, PERRLA.  ABSENT: scleral icterus


Ear exam: PRESENT: normal external ear exam


Mouth exam: PRESENT: moist, tongue midline


Neck exam: PRESENT: full ROM.  ABSENT: carotid bruit, JVD, lymphadenopathy, 

thyromegaly


Respiratory exam: PRESENT: clear to auscultation juan


Cardiovascular exam: PRESENT: RRR.  ABSENT: diastolic murmur, rubs, systolic 

murmur


Pulses: PRESENT: normal dorsalis pedis pul, +2 pedal pulses bilateral


Vascular exam: PRESENT: normal capillary refill


GI/Abdominal exam: PRESENT: normal bowel sounds, soft.  ABSENT: distended, 

guarding, mass, organolmegaly, rebound, tenderness


Rectal exam: PRESENT: deferred


Musculoskeletal exam: PRESENT: ambulatory


Neurological exam: PRESENT: alert, awake, oriented to person, oriented to place,

oriented to time, oriented to situation, CN II-XII grossly intact.  ABSENT: 

motor sensory deficit


Psychiatric exam: PRESENT: appropriate affect, normal mood.  ABSENT: homicidal 

ideation, suicidal ideation


Skin exam: PRESENT: dry, intact, warm.  ABSENT: cyanosis, rash





Results


Laboratory Results: 


                                        





                                 09/16/20 05:05 





                                 09/16/20 05:05 





                                        











  09/16/20 09/16/20





  05:05 05:05


 


WBC   8.9


 


RBC   3.15 L


 


Hgb   9.9 L


 


Hct   29.2 L


 


MCV   93


 


MCH   31.5


 


MCHC   34.0


 


RDW   15.3 H


 


Plt Count   172


 


Seg Neutrophils %   79.9 H


 


Sodium  137.7 


 


Potassium  3.8 


 


Chloride  96 L 


 


Carbon Dioxide  29 


 


Anion Gap  13 


 


BUN  44 H 


 


Creatinine  8.68 H 


 


Est GFR (African Amer)  5 L 


 


Glucose  113 H 


 


Calcium  9.2 








                                        





09/13/20 19:10   Blood   Blood Culture (PCR) - Final


                            Staphylococcus Species


09/13/20 19:10   Blood   Blood Culture - Final


                            Staphylococcus Epidermidis





                                        











  09/13/20 09/13/20 09/14/20





  18:20 18:20 00:43


 


Creatine Kinase  75  


 


CK-MB (CK-2)   2.27 


 


Troponin I   0.187  0.846














  09/14/20 09/14/20 09/14/20





  00:43 00:43 06:33


 


Creatine Kinase  76   71


 


CK-MB (CK-2)   3.59 


 


Troponin I   Cancelled 














  09/14/20 09/14/20 09/14/20





  06:33 12:49 12:49


 


Creatine Kinase   66 


 


CK-MB (CK-2)  2.77   2.34


 


Troponin I  1.080   0.932











Impressions: 


                                        





Chest X-Ray  09/14/20 00:00


IMPRESSION:  Marked improvement status post dialysis.


 














Assessment & Plan





- Diagnosis


(1) Respiratory distress


Is this a current diagnosis for this admission?: Yes   


Plan: 


Due to the aortic valve stenosis currently all resolved from [facility] pulmonary 

edema








(2) End stage renal disease


Is this a current diagnosis for this admission?: Yes   


Plan: 


Currently on hemodialysis








(3) Type 2 diabetes mellitus


Qualifiers: 


   Diabetes mellitus long term insulin use: without long term use   Chronic 

kidney disease stage: on chronic dialysis 


Is this a current diagnosis for this admission?: Yes   


Plan: 


Currently all stable








(4) Essential hypertension


Is this a current diagnosis for this admission?: Yes   





(5) Mixed hyperlipidemia


Is this a current diagnosis for this admission?: Yes   





(6) CHF (congestive heart failure)


Qualifiers: 


   Heart failure type: unspecified   Heart failure chronicity: acute   Qualified

Code(s): I50.9 - Heart failure, unspecified   


Is this a current diagnosis for this admission?: Yes   





(7) Pneumonia


Qualifiers: 


   Pneumonia type: due to unspecified organism   Laterality: right   Lung l

ocation: unspecified part of lung   Qualified Code(s): J18.9 - Pneumonia, 

unspecified organism   


Is this a current diagnosis for this admission?: Yes   


Plan: 


No sign of any pneumonia will discontinue his on IV antibiotic








(8) Elevated troponin


Is this a current diagnosis for this admission?: Yes   





(9) Aortic stenosis


Qualifiers: 


   Cardiac valve disease etiology: nonrheumatic   Qualified Code(s): I35.0 - 

Nonrheumatic aortic (valve) stenosis   


Is this a current diagnosis for this admission?: Yes   


Plan: 


As per discussed with the patient in the daughter patients do not want to go to 

[facility] as transport already made it by Dr. Aguero discussed with the Dr. Aguero and suggest that is okay to patient follow outpatients cardiology within 

a week








- Time


Time Spent with patient: 25-34 minutes


Level of Care: IMCU


Medications reviewed and adjusted accordingly: Yes


Anticipated discharge: Home


Anticipated DC Timeframe: within 24 hours





- Plan Summary


Plan Summary: 





Continues to current medications

## 2020-09-16 NOTE — PDOC PROGRESS REPORT
Subjective


Progress Note for:: 09/16/20


Reason For Visit: 


Patient seen during dialysis.  Chart reviewed.  Patient admitted with flash 

pulmonary edema in the setting of moderately severe aortic stenosis with 

preserved LVEF/moderately severe pulmonary hypertension.  Incidental findings of

troponin elevation and patient posted for transfer to tertiary care center for 

cardiac catheterization and interventions given the high test probability.  

Patient undergoing dialysis without any issues.  Vital signs are stable with s

ystolic blood pressures in the 130.  Labs and medications were reviewed.  

Patient denies any history of chest pain or shortness of breath.  She is talking

quite comfortably.  Dialysis orders were reviewed with the treating dialysis 

nurse with specific instructions not to remove more than 1-2 L of fluid and to 

keep the systolic blood pressure in the 120s/130s systolics.





Physical Exam


Vital Signs: 


                                        











Temp Pulse Resp BP Pulse Ox


 


 98.8 F   95   17   124/66   95 


 


 09/16/20 07:51  09/16/20 07:51  09/16/20 07:51  09/16/20 07:51  09/16/20 07:51








                                 Intake & Output











 09/15/20 09/16/20 09/17/20





 06:59 06:59 06:59


 


Intake Total 375 410 


 


Output Total 3100  


 


Balance -2725 410 


 


Weight 64.1 kg 63.8 kg 











General appearance: PRESENT: no acute distress


Respiratory exam: PRESENT: clear to auscultation juan, decreased breath sounds.  

ABSENT: crackles


Cardiovascular exam: PRESENT: +S1, +S2, systolic murmur


GI/Abdominal exam: PRESENT: normal bowel sounds, soft.  ABSENT: organomegaly, 

tenderness


Extremities exam: ABSENT: pedal edema


Neurological exam: PRESENT: alert, awake, oriented to person, oriented to place


Psychiatric exam: PRESENT: appropriate affect





Results


Laboratory Results: 


                                        





                                 09/16/20 05:05 





                                 09/16/20 05:05 





                                        











  09/16/20 09/16/20





  05:05 05:05


 


WBC   8.9


 


RBC   3.15 L


 


Hgb   9.9 L


 


Hct   29.2 L


 


MCV   93


 


MCH   31.5


 


MCHC   34.0


 


RDW   15.3 H


 


Plt Count   172


 


Seg Neutrophils %   79.9 H


 


Sodium  137.7 


 


Potassium  3.8 


 


Chloride  96 L 


 


Carbon Dioxide  29 


 


Anion Gap  13 


 


BUN  44 H 


 


Creatinine  8.68 H 


 


Est GFR (African Amer)  5 L 


 


Glucose  113 H 


 


Calcium  9.2 








                                        





09/13/20 19:10   Blood   Blood Culture (PCR) - Final


                            Staphylococcus Species


09/13/20 19:10   Blood   Blood Culture - Final


                            Staphylococcus Epidermidis





                                        











  09/13/20 09/13/20 09/14/20





  18:20 18:20 00:43


 


Creatine Kinase  75  


 


CK-MB (CK-2)   2.27 


 


Troponin I   0.187  0.846














  09/14/20 09/14/20 09/14/20





  00:43 00:43 06:33


 


Creatine Kinase  76   71


 


CK-MB (CK-2)   3.59 


 


Troponin I   Cancelled 














  09/14/20 09/14/20 09/14/20





  06:33 12:49 12:49


 


Creatine Kinase   66 


 


CK-MB (CK-2)  2.77   2.34


 


Troponin I  1.080   0.932











Impressions: 


                                        





Chest X-Ray  09/14/20 00:00


IMPRESSION:  Marked improvement status post dialysis.


 














Assessment & Plan





- Diagnosis


(1) Aortic stenosis


Qualifiers: 


   Cardiac valve disease etiology: nonrheumatic   Qualified Code(s): I35.0 - 

Nonrheumatic aortic (valve) stenosis   


Is this a current diagnosis for this admission?: Yes   


Plan: 


Moderately severe AS, presenting as flash pulmonary edema with elevated 

troponins.  Patient in the process of being transferred to tertiary care center 

for cardiac cath and further evaluations and management.  Currently asymptomatic

for chest pain / shortness of breath / pre syncope.








(2) CHF (congestive heart failure)


Qualifiers: 


   Heart failure type: unspecified   Heart failure chronicity: acute   Qualified

Code(s): I50.9 - Heart failure, unspecified   


Is this a current diagnosis for this admission?: Yes   


Plan: 


Clinically resolved and stable.  Gentle ultrafiltration ongoing on current 

dialysis.  Monitor.








(3) End stage renal disease


Is this a current diagnosis for this admission?: Yes   


Plan: 


Patient currently undergoing dialysis with gentle ultrafiltration.  Dialysis 

being supervised.  Plan to remove 1-2 L as tolerated with systolics to be kept 

in the 120-130 systolics.  Dialysis orders were reviewed with the treating 

dialysis nurse.








(4) Essential hypertension


Is this a current diagnosis for this admission?: Yes   


Plan: 


Stable.








(5) Type 2 diabetes mellitus


Qualifiers: 


   Diabetes mellitus long term insulin use: without long term use   Chronic 

kidney disease stage: on chronic dialysis 


Is this a current diagnosis for this admission?: Yes   


Plan: 


As per Dr. Wladrop.

## 2020-09-17 VITALS — SYSTOLIC BLOOD PRESSURE: 134 MMHG | DIASTOLIC BLOOD PRESSURE: 64 MMHG

## 2020-09-17 LAB
ADD MANUAL DIFF: NO
BASOPHILS # BLD AUTO: 0.1 10^3/UL (ref 0–0.2)
BASOPHILS NFR BLD AUTO: 1.2 % (ref 0–2)
EOSINOPHIL # BLD AUTO: 0.2 10^3/UL (ref 0–0.6)
EOSINOPHIL NFR BLD AUTO: 2.8 % (ref 0–6)
ERYTHROCYTE [DISTWIDTH] IN BLOOD BY AUTOMATED COUNT: 15.3 % (ref 11.5–14)
HCT VFR BLD CALC: 28.5 % (ref 36–47)
HGB BLD-MCNC: 9.5 G/DL (ref 12–15.5)
LYMPHOCYTES # BLD AUTO: 1.1 10^3/UL (ref 0.5–4.7)
LYMPHOCYTES NFR BLD AUTO: 18.3 % (ref 13–45)
MCH RBC QN AUTO: 31.2 PG (ref 27–33.4)
MCHC RBC AUTO-ENTMCNC: 33.5 G/DL (ref 32–36)
MCV RBC AUTO: 93 FL (ref 80–97)
MONOCYTES # BLD AUTO: 0.5 10^3/UL (ref 0.1–1.4)
MONOCYTES NFR BLD AUTO: 8.3 % (ref 3–13)
NEUTROPHILS # BLD AUTO: 4.3 10^3/UL (ref 1.7–8.2)
NEUTS SEG NFR BLD AUTO: 69.4 % (ref 42–78)
PLATELET # BLD: 163 10^3/UL (ref 150–450)
RBC # BLD AUTO: 3.06 10^6/UL (ref 3.72–5.28)
TOTAL CELLS COUNTED % (AUTO): 100 %
WBC # BLD AUTO: 6.2 10^3/UL (ref 4–10.5)

## 2020-09-17 RX ADMIN — CLONIDINE HYDROCHLORIDE SCH MG: 0.2 TABLET ORAL at 09:48

## 2020-09-17 RX ADMIN — SEVELAMER HYDROCHLORIDE SCH: 800 TABLET, FILM COATED PARENTERAL at 09:44

## 2020-09-17 RX ADMIN — NIFEDIPINE SCH MG: 30 TABLET, FILM COATED, EXTENDED RELEASE ORAL at 06:00

## 2020-09-17 RX ADMIN — INSULIN LISPRO SCH: 100 INJECTION, SOLUTION INTRAVENOUS; SUBCUTANEOUS at 09:43

## 2020-09-17 RX ADMIN — HEPARIN SODIUM SCH UNIT: 5000 INJECTION, SOLUTION INTRAVENOUS; SUBCUTANEOUS at 06:05
